# Patient Record
Sex: FEMALE | Race: ASIAN | NOT HISPANIC OR LATINO
[De-identification: names, ages, dates, MRNs, and addresses within clinical notes are randomized per-mention and may not be internally consistent; named-entity substitution may affect disease eponyms.]

---

## 2019-04-22 ENCOUNTER — TRANSCRIPTION ENCOUNTER (OUTPATIENT)
Age: 39
End: 2019-04-22

## 2020-10-21 ENCOUNTER — TRANSCRIPTION ENCOUNTER (OUTPATIENT)
Age: 40
End: 2020-10-21

## 2020-10-21 VITALS
HEIGHT: 61 IN | TEMPERATURE: 98 F | WEIGHT: 171.3 LBS | RESPIRATION RATE: 16 BRPM | DIASTOLIC BLOOD PRESSURE: 67 MMHG | SYSTOLIC BLOOD PRESSURE: 99 MMHG | OXYGEN SATURATION: 98 % | HEART RATE: 89 BPM

## 2020-10-22 ENCOUNTER — INPATIENT (INPATIENT)
Facility: HOSPITAL | Age: 40
LOS: 0 days | Discharge: HOME | DRG: 750 | End: 2020-10-23
Attending: OBSTETRICS & GYNECOLOGY | Admitting: OBSTETRICS & GYNECOLOGY
Payer: COMMERCIAL

## 2020-10-22 ENCOUNTER — RESULT REVIEW (OUTPATIENT)
Age: 40
End: 2020-10-22

## 2020-10-22 DIAGNOSIS — N80.9 ENDOMETRIOSIS, UNSPECIFIED: ICD-10-CM

## 2020-10-22 DIAGNOSIS — Z98.89 OTHER SPECIFIED POSTPROCEDURAL STATES: Chronic | ICD-10-CM

## 2020-10-22 DIAGNOSIS — Z98.890 OTHER SPECIFIED POSTPROCEDURAL STATES: ICD-10-CM

## 2020-10-22 PROCEDURE — 88342 IMHCHEM/IMCYTCHM 1ST ANTB: CPT | Mod: 26

## 2020-10-22 PROCEDURE — 88341 IMHCHEM/IMCYTCHM EA ADD ANTB: CPT | Mod: 26

## 2020-10-22 PROCEDURE — 88305 TISSUE EXAM BY PATHOLOGIST: CPT | Mod: 26

## 2020-10-22 RX ORDER — CELECOXIB 200 MG/1
400 CAPSULE ORAL ONCE
Refills: 0 | Status: COMPLETED | OUTPATIENT
Start: 2020-10-22 | End: 2020-10-22

## 2020-10-22 RX ORDER — HEPARIN SODIUM 5000 [USP'U]/ML
5000 INJECTION INTRAVENOUS; SUBCUTANEOUS ONCE
Refills: 0 | Status: COMPLETED | OUTPATIENT
Start: 2020-10-22 | End: 2020-10-22

## 2020-10-22 RX ORDER — GABAPENTIN 400 MG/1
600 CAPSULE ORAL ONCE
Refills: 0 | Status: COMPLETED | OUTPATIENT
Start: 2020-10-22 | End: 2020-10-22

## 2020-10-22 RX ORDER — SIMETHICONE 80 MG/1
80 TABLET, CHEWABLE ORAL EVERY 6 HOURS
Refills: 0 | Status: DISCONTINUED | OUTPATIENT
Start: 2020-10-22 | End: 2020-10-23

## 2020-10-22 RX ORDER — METOCLOPRAMIDE HCL 10 MG
10 TABLET ORAL EVERY 8 HOURS
Refills: 0 | Status: DISCONTINUED | OUTPATIENT
Start: 2020-10-22 | End: 2020-10-23

## 2020-10-22 RX ORDER — ALBUTEROL 90 UG/1
2 AEROSOL, METERED ORAL EVERY 4 HOURS
Refills: 0 | Status: DISCONTINUED | OUTPATIENT
Start: 2020-10-22 | End: 2020-10-23

## 2020-10-22 RX ORDER — SODIUM CHLORIDE 9 MG/ML
1000 INJECTION, SOLUTION INTRAVENOUS
Refills: 0 | Status: DISCONTINUED | OUTPATIENT
Start: 2020-10-22 | End: 2020-10-23

## 2020-10-22 RX ORDER — ONDANSETRON 8 MG/1
8 TABLET, FILM COATED ORAL EVERY 8 HOURS
Refills: 0 | Status: DISCONTINUED | OUTPATIENT
Start: 2020-10-22 | End: 2020-10-23

## 2020-10-22 RX ORDER — ACETAMINOPHEN 500 MG
1000 TABLET ORAL ONCE
Refills: 0 | Status: COMPLETED | OUTPATIENT
Start: 2020-10-22 | End: 2020-10-22

## 2020-10-22 RX ORDER — KETOROLAC TROMETHAMINE 30 MG/ML
30 SYRINGE (ML) INJECTION EVERY 6 HOURS
Refills: 0 | Status: DISCONTINUED | OUTPATIENT
Start: 2020-10-22 | End: 2020-10-23

## 2020-10-22 RX ORDER — ACETAMINOPHEN 500 MG
1000 TABLET ORAL EVERY 6 HOURS
Refills: 0 | Status: DISCONTINUED | OUTPATIENT
Start: 2020-10-22 | End: 2020-10-23

## 2020-10-22 RX ORDER — HYDROMORPHONE HYDROCHLORIDE 2 MG/ML
0.5 INJECTION INTRAMUSCULAR; INTRAVENOUS; SUBCUTANEOUS EVERY 6 HOURS
Refills: 0 | Status: DISCONTINUED | OUTPATIENT
Start: 2020-10-22 | End: 2020-10-23

## 2020-10-22 RX ORDER — HYDROMORPHONE HYDROCHLORIDE 2 MG/ML
0.5 INJECTION INTRAMUSCULAR; INTRAVENOUS; SUBCUTANEOUS
Refills: 0 | Status: DISCONTINUED | OUTPATIENT
Start: 2020-10-22 | End: 2020-10-22

## 2020-10-22 RX ADMIN — GABAPENTIN 600 MILLIGRAM(S): 400 CAPSULE ORAL at 07:43

## 2020-10-22 RX ADMIN — HYDROMORPHONE HYDROCHLORIDE 0.5 MILLIGRAM(S): 2 INJECTION INTRAMUSCULAR; INTRAVENOUS; SUBCUTANEOUS at 10:58

## 2020-10-22 RX ADMIN — Medication 30 MILLIGRAM(S): at 12:26

## 2020-10-22 RX ADMIN — CELECOXIB 400 MILLIGRAM(S): 200 CAPSULE ORAL at 07:43

## 2020-10-22 RX ADMIN — Medication 1000 MILLIGRAM(S): at 13:26

## 2020-10-22 RX ADMIN — Medication 30 MILLIGRAM(S): at 19:00

## 2020-10-22 RX ADMIN — HEPARIN SODIUM 5000 UNIT(S): 5000 INJECTION INTRAVENOUS; SUBCUTANEOUS at 07:44

## 2020-10-22 RX ADMIN — HYDROMORPHONE HYDROCHLORIDE 0.5 MILLIGRAM(S): 2 INJECTION INTRAMUSCULAR; INTRAVENOUS; SUBCUTANEOUS at 10:37

## 2020-10-22 RX ADMIN — Medication 1000 MILLIGRAM(S): at 14:12

## 2020-10-22 RX ADMIN — HYDROMORPHONE HYDROCHLORIDE 0.5 MILLIGRAM(S): 2 INJECTION INTRAMUSCULAR; INTRAVENOUS; SUBCUTANEOUS at 10:15

## 2020-10-22 RX ADMIN — Medication 1000 MILLIGRAM(S): at 07:44

## 2020-10-22 RX ADMIN — HYDROMORPHONE HYDROCHLORIDE 0.5 MILLIGRAM(S): 2 INJECTION INTRAMUSCULAR; INTRAVENOUS; SUBCUTANEOUS at 12:35

## 2020-10-22 RX ADMIN — HYDROMORPHONE HYDROCHLORIDE 0.5 MILLIGRAM(S): 2 INJECTION INTRAMUSCULAR; INTRAVENOUS; SUBCUTANEOUS at 10:30

## 2020-10-22 RX ADMIN — Medication 30 MILLIGRAM(S): at 13:00

## 2020-10-22 RX ADMIN — HYDROMORPHONE HYDROCHLORIDE 0.5 MILLIGRAM(S): 2 INJECTION INTRAMUSCULAR; INTRAVENOUS; SUBCUTANEOUS at 12:02

## 2020-10-22 RX ADMIN — Medication 1000 MILLIGRAM(S): at 19:00

## 2020-10-22 NOTE — H&P ADULT - PROBLEM SELECTOR PLAN 1
-NPO, IVF  -ERAS meds  -Anesthesia consult   -Consents signed, Dr. Alves at bedside     Daniel Figueroa PGY2

## 2020-10-22 NOTE — BRIEF OPERATIVE NOTE - NSICDXBRIEFPROCEDURE_GEN_ALL_CORE_FT
PROCEDURES:  Excision, endometriosis, laparoscopic 22-Oct-2020 09:55:58  Daniel Figueroa  Hysteroscopy, with dilation and curettage 22-Oct-2020 09:55:46  Daniel Figueroa

## 2020-10-22 NOTE — H&P ADULT - HISTORY OF PRESENT ILLNESS
CHARLIE WYNNE  40y  Female [id number]    HPI: Patient is a 40 year old G0 who presents for scheduled laparoscopic surgery for chronic pelvic pain.         Name of GYN Physician: Joselyn     POB:  G0  GynHx: chronic pelvic pain  PMHx: asthma, excema  SurgHx: diagnostic laparoscopy 2012  Meds: motrin 800mg prn, tramadol 50mg prn, medical THC, tizanidine prn  All: milk, neosporin (rash)   SocHx: social drinker, denies toabcco use, ilicit drug use                          CHARLIE WYNNE  40y  Female [id number]    HPI: Patient is a 40 year old G0, LMP q 4 months with mild spotting (Mirena IUD in place) who presents for scheduled laparoscopic surgery for chronic pelvic pain. She has had pain for 8+ years and had a diagnostic l/s in 2012 but was inconclusive. However she still struggles with b/l abdominal pain, worse the last 2 weeks of the month. She describes it as pulling, deep; associted nausea/vomitting when the pain is severe. SHe also reports ocassional loose stools but denies any pain with defication .        Name of GYN Physician: Joselyn     POB:  G0  GynHx: chronic pelvic pain  PMHx: asthma, excema  SurgHx: diagnostic laparoscopy 2012  Meds: motrin 800mg prn, tramadol 50mg prn, medical THC, tizanidine prn  All: milk, neosporin (rash)   SocHx: social drinker, denies toabcco use, ilicit drug use                          CHARLIE WYNNE  40y  Female [id number]    HPI: Patient is a 40 year old G0, LMP q 4 months with mild spotting (Mirena IUD in place) who presents for scheduled laparoscopic surgery for chronic pelvic pain. She has had pain for 8+ years and had a diagnostic l/s in 2012 but was inconclusive. However she still struggles with b/l abdominal pain, worse the last 2 weeks of the month. She describes it as pulling, deep; associted nausea/vomitting when the pain is severe. SHe also reports occasional loose stools but denies any pain with defecation denies any dyspareunia She also reports bilateral anterior leg pain when she experiences her abdominal pain at its worse.         Name of GYN Physician: Joselyn     POB:  G0  GynHx: chronic pelvic pain  PMHx: asthma, excema  SurgHx: diagnostic laparoscopy 2012  Meds: motrin 800mg prn, tramadol 50mg prn, medical THC, tizanidine prn  All: milk, neosporin (rash)   SocHx: social drinker, denies toabcco use, ilicit drug use

## 2020-10-22 NOTE — H&P ADULT - ASSESSMENT
40 year old G0 with PMH of chronic pelvic pain who presents for scheduled laparoscopic endometriosis excision.

## 2020-10-22 NOTE — PROGRESS NOTE ADULT - PROBLEM SELECTOR PLAN 1
Neuro: tylenol/toradol for pain, dilaudid PRN, zofran/reglan   CV: Hemodynamically stable  Pulm: Saturating well on room air. Encourage incentive spirometry  GI: continue regular diet   : UOP adequate, monitor overnight  Heme: SCDs, ambulation; f/u AM labs     Daniel Figueroa PGY2

## 2020-10-22 NOTE — PROGRESS NOTE ADULT - SUBJECTIVE AND OBJECTIVE BOX
Patient seen and examined at bedside. No acute complaints. Pain well controlled.  Patient tolerating clears. Has not yet passed flatus. Bacon in place. Denies CP, SOB, N/V, fevers, and chills.    Vital Signs Last 24 Hours  T(C): 36.8 (10-22-20 @ 13:14), Max: 36.8 (10-22-20 @ 13:14)  HR: 77 (10-22-20 @ 13:14) (74 - 97)  BP: 101/56 (10-22-20 @ 13:14) (82/46 - 112/63)  RR: 18 (10-22-20 @ 13:14) (10 - 18)  SpO2: 99% (10-22-20 @ 13:14) (98% - 100%)    I&O's Summary    22 Oct 2020 07:01  -  22 Oct 2020 14:49  --------------------------------------------------------  IN: 500 mL / OUT: 550 mL / NET: -50 mL        Physical Exam:  General: NAD  CV: NR, RR, S1, S2, no M/R/G  Lungs: CTA-B  Abdomen: Soft, non-distended, appropriately tender  Incision: CDI, dressings in place   Ext: No pain or swelling    Labs:      MEDICATIONS  (STANDING):  acetaminophen   Tablet .. 1000 milliGRAM(s) Oral every 6 hours  ketorolac   Injectable 30 milliGRAM(s) IV Push every 6 hours  lactated ringers. 1000 milliLiter(s) (125 mL/Hr) IV Continuous <Continuous>    MEDICATIONS  (PRN):  ALBUTerol    90 MICROgram(s) HFA Inhaler 2 Puff(s) Inhalation every 4 hours PRN Bronchospasm  HYDROmorphone  Injectable 0.5 milliGRAM(s) IV Push every 6 hours PRN Breakthrough Pain  metoclopramide Injectable 10 milliGRAM(s) IV Push every 8 hours PRN Nausea  ondansetron Injectable 8 milliGRAM(s) IV Push every 8 hours PRN Nausea and/or Vomiting  simethicone 80 milliGRAM(s) Chew every 6 hours PRN Gas

## 2020-10-22 NOTE — BRIEF OPERATIVE NOTE - OPERATION/FINDINGS
Mirena IUD removed on hysteroscopy D&C; hypervascularity seen near both ostia, benign appearing endometrium. Normal appearing uterus, tubes, and ovaries. Grossly normal abdominal anatomy. Ovarian suspensions placed. Dissection of sigmoid colon from left side wall. Bilateral uterolysis. Endometrial implants appreciated and excised from right and left pelvic side walls. Hemostasis achieved.  Appendix not visualized. Fascia closed with 0 vicryl suture. Skin closed with 4.0 monocryl. Count corrrect, patient cleaned and stable.

## 2020-10-22 NOTE — H&P ADULT - NSHPPHYSICALEXAM_GEN_ALL_CORE
VS  T(C): 36.8 (10-21-20 @ 11:46)  HR: 89 (10-21-20 @ 11:46)  BP: 99/67 (10-21-20 @ 11:46)  RR: 16 (10-21-20 @ 11:46)  SpO2: 98% (10-21-20 @ 11:46)    Gen: NAD  CV: RRR  Resp: lungs CTA  Abd: soft nontender; tender to palpation in R/L lower quadrants, no rebound, no guarding

## 2020-10-23 ENCOUNTER — TRANSCRIPTION ENCOUNTER (OUTPATIENT)
Age: 40
End: 2020-10-23

## 2020-10-23 VITALS
HEART RATE: 81 BPM | SYSTOLIC BLOOD PRESSURE: 96 MMHG | OXYGEN SATURATION: 98 % | DIASTOLIC BLOOD PRESSURE: 63 MMHG | TEMPERATURE: 98 F | RESPIRATION RATE: 16 BRPM

## 2020-10-23 LAB
ANION GAP SERPL CALC-SCNC: 8 MMOL/L — SIGNIFICANT CHANGE UP (ref 5–17)
BUN SERPL-MCNC: 11 MG/DL — SIGNIFICANT CHANGE UP (ref 7–23)
CALCIUM SERPL-MCNC: 9 MG/DL — SIGNIFICANT CHANGE UP (ref 8.4–10.5)
CHLORIDE SERPL-SCNC: 106 MMOL/L — SIGNIFICANT CHANGE UP (ref 96–108)
CO2 SERPL-SCNC: 24 MMOL/L — SIGNIFICANT CHANGE UP (ref 22–31)
CREAT SERPL-MCNC: 0.64 MG/DL — SIGNIFICANT CHANGE UP (ref 0.5–1.3)
GLUCOSE SERPL-MCNC: 135 MG/DL — HIGH (ref 70–99)
HCT VFR BLD CALC: 33.7 % — LOW (ref 34.5–45)
HGB BLD-MCNC: 10.9 G/DL — LOW (ref 11.5–15.5)
MAGNESIUM SERPL-MCNC: 1.9 MG/DL — SIGNIFICANT CHANGE UP (ref 1.6–2.6)
MCHC RBC-ENTMCNC: 28.6 PG — SIGNIFICANT CHANGE UP (ref 27–34)
MCHC RBC-ENTMCNC: 32.3 GM/DL — SIGNIFICANT CHANGE UP (ref 32–36)
MCV RBC AUTO: 88.5 FL — SIGNIFICANT CHANGE UP (ref 80–100)
NRBC # BLD: 0 /100 WBCS — SIGNIFICANT CHANGE UP (ref 0–0)
PHOSPHATE SERPL-MCNC: 3.1 MG/DL — SIGNIFICANT CHANGE UP (ref 2.5–4.5)
PLATELET # BLD AUTO: 255 K/UL — SIGNIFICANT CHANGE UP (ref 150–400)
POTASSIUM SERPL-MCNC: 4.1 MMOL/L — SIGNIFICANT CHANGE UP (ref 3.5–5.3)
POTASSIUM SERPL-SCNC: 4.1 MMOL/L — SIGNIFICANT CHANGE UP (ref 3.5–5.3)
RBC # BLD: 3.81 M/UL — SIGNIFICANT CHANGE UP (ref 3.8–5.2)
RBC # FLD: 13.5 % — SIGNIFICANT CHANGE UP (ref 10.3–14.5)
SODIUM SERPL-SCNC: 138 MMOL/L — SIGNIFICANT CHANGE UP (ref 135–145)
WBC # BLD: 12.17 K/UL — HIGH (ref 3.8–10.5)
WBC # FLD AUTO: 12.17 K/UL — HIGH (ref 3.8–10.5)

## 2020-10-23 PROCEDURE — 36415 COLL VENOUS BLD VENIPUNCTURE: CPT

## 2020-10-23 PROCEDURE — 86901 BLOOD TYPING SEROLOGIC RH(D): CPT

## 2020-10-23 PROCEDURE — 88341 IMHCHEM/IMCYTCHM EA ADD ANTB: CPT

## 2020-10-23 PROCEDURE — 85027 COMPLETE CBC AUTOMATED: CPT

## 2020-10-23 PROCEDURE — 80048 BASIC METABOLIC PNL TOTAL CA: CPT

## 2020-10-23 PROCEDURE — 88305 TISSUE EXAM BY PATHOLOGIST: CPT

## 2020-10-23 PROCEDURE — 84100 ASSAY OF PHOSPHORUS: CPT

## 2020-10-23 PROCEDURE — 86850 RBC ANTIBODY SCREEN: CPT

## 2020-10-23 PROCEDURE — 86900 BLOOD TYPING SEROLOGIC ABO: CPT

## 2020-10-23 PROCEDURE — 83735 ASSAY OF MAGNESIUM: CPT

## 2020-10-23 RX ORDER — ACETAMINOPHEN 500 MG
2 TABLET ORAL
Qty: 0 | Refills: 0 | DISCHARGE
Start: 2020-10-23

## 2020-10-23 RX ORDER — MAGNESIUM SULFATE 500 MG/ML
2 VIAL (ML) INJECTION ONCE
Refills: 0 | Status: COMPLETED | OUTPATIENT
Start: 2020-10-23 | End: 2020-10-23

## 2020-10-23 RX ORDER — ALBUTEROL 90 UG/1
2 AEROSOL, METERED ORAL
Qty: 0 | Refills: 0 | DISCHARGE
Start: 2020-10-23

## 2020-10-23 RX ADMIN — Medication 30 MILLIGRAM(S): at 01:00

## 2020-10-23 RX ADMIN — Medication 30 MILLIGRAM(S): at 00:46

## 2020-10-23 RX ADMIN — Medication 1000 MILLIGRAM(S): at 01:00

## 2020-10-23 RX ADMIN — Medication 30 MILLIGRAM(S): at 06:39

## 2020-10-23 RX ADMIN — Medication 1000 MILLIGRAM(S): at 06:39

## 2020-10-23 RX ADMIN — Medication 30 MILLIGRAM(S): at 11:48

## 2020-10-23 RX ADMIN — Medication 30 MILLIGRAM(S): at 12:17

## 2020-10-23 RX ADMIN — Medication 1000 MILLIGRAM(S): at 00:46

## 2020-10-23 RX ADMIN — Medication 50 GRAM(S): at 11:47

## 2020-10-23 RX ADMIN — Medication 1000 MILLIGRAM(S): at 12:17

## 2020-10-23 RX ADMIN — Medication 1000 MILLIGRAM(S): at 11:47

## 2020-10-23 RX ADMIN — SIMETHICONE 80 MILLIGRAM(S): 80 TABLET, CHEWABLE ORAL at 11:57

## 2020-10-23 NOTE — PROGRESS NOTE ADULT - ASSESSMENT
40y POD# 1  s/p laparoscopic endometriosis excision, bilateral uterolysis, hysteroscopy D&C,  removal if IUD without complication.  Patient is stable and doing well.  Patient is meeting all post op milestones.

## 2020-10-23 NOTE — PROGRESS NOTE ADULT - PROBLEM SELECTOR PLAN 1
Neuro: pain is well controlled; continue tylenol/toradol, transition to motrin   CV: hemodynamically stable, monitor vitals  Pulm: saturating well on room air, encourage oob/amb  GI: tolerating regular diet, hep lock IV   : remove cheng; patient due to void   Heme: SCDs for DVT ppx, AM H/H within expected limits   ID: afebrile  Dispo: continue routine post-op care    Daniel Figueroa PGY2

## 2020-10-23 NOTE — DISCHARGE NOTE NURSING/CASE MANAGEMENT/SOCIAL WORK - PATIENT PORTAL LINK FT
You can access the FollowMyHealth Patient Portal offered by [facility] by registering at the following website: [email or web address]. By joining [employer]’s FollowMyHealth portal, you will also be able to view your health information using other applications (apps) compatible with our system.

## 2020-10-23 NOTE — DISCHARGE NOTE PROVIDER - CARE PROVIDER_API CALL
Hemalatha Alves  OBSTETRICS AND GYNECOLOGY  [address] [place] [address]  Phone: [phone]  Fax: [phone]  Established Patient  Follow Up Time:

## 2020-10-23 NOTE — DISCHARGE NOTE PROVIDER - NSDCMRMEDTOKEN_GEN_ALL_CORE_FT
acetaminophen 500 mg oral tablet: 2 tab(s) orally every 6 hours  albuterol 90 mcg/inh inhalation aerosol: 2 puff(s) inhaled every 4 hours, As needed, Bronchospasm

## 2020-10-23 NOTE — DISCHARGE NOTE PROVIDER - INSTRUCTIONS
Regular diet as tolerated: avoid too much fiber or fat in your diet if you feel bloated. Avoid drinking alcohol when taking narcotic pain medication.

## 2020-10-23 NOTE — DISCHARGE NOTE PROVIDER - HOSPITAL COURSE
40y POD# 1  s/p laparoscopic endometriosis excision, bilateral uterolysis, hysteroscopy D&C,  removal if IUD without complication.  Patient is stable and doing well.  Patient is meeting all post op milestones. Vitals are stable for discharge; patient will have close follow up as an outpatient.

## 2020-10-23 NOTE — PROGRESS NOTE ADULT - SUBJECTIVE AND OBJECTIVE BOX
R2 GYN Progress Note  POD#1   HD#2    Patient seen and examined at bedside.  No acute events overnight. No acute complaints.  Pain well controlled.  Patient has not ambulated; tolerated a regular diet.   Has not yet passed flatus yet.   Bacon is still in place.   Denies CP, SOB, N/V, fevers, and chills.    Vital Signs Last 24 Hours  T(C): 36.8 (10-23-20 @ 05:53), Max: 37.3 (10-22-20 @ 17:51)  HR: 89 (10-23-20 @ 05:53) (74 - 97)  BP: 99/63 (10-23-20 @ 05:53) (82/46 - 112/63)  RR: 17 (10-23-20 @ 05:53) (10 - 18)  SpO2: 99% (10-23-20 @ 05:53) (98% - 100%)    I&O's Summary    22 Oct 2020 07:01  -  23 Oct 2020 06:46  --------------------------------------------------------  IN: 2110 mL / OUT: 1200 mL / NET: 910 mL        Physical Exam:  General: NAD  CV: RR, S1, S2  Lungs: CTA b/l, good air flow b/l   Abdomen: Soft, non distended, appropriately tender to palpation; bowel sounds present in all quadrants   Incision: port site incisions c/d/i, bilateral ovarian suspensions removed   Ext: warm and well perfused    Labs:                        10.9   12.17 )-----------( 255      ( 23 Oct 2020 06:21 )             33.7         MEDICATIONS  (STANDING):  acetaminophen   Tablet .. 1000 milliGRAM(s) Oral every 6 hours  ketorolac   Injectable 30 milliGRAM(s) IV Push every 6 hours  lactated ringers. 1000 milliLiter(s) (125 mL/Hr) IV Continuous <Continuous>    MEDICATIONS  (PRN):  ALBUTerol    90 MICROgram(s) HFA Inhaler 2 Puff(s) Inhalation every 4 hours PRN Bronchospasm  HYDROmorphone  Injectable 0.5 milliGRAM(s) IV Push every 6 hours PRN Breakthrough Pain  metoclopramide Injectable 10 milliGRAM(s) IV Push every 8 hours PRN Nausea  ondansetron Injectable 8 milliGRAM(s) IV Push every 8 hours PRN Nausea and/or Vomiting  simethicone 80 milliGRAM(s) Chew every 6 hours PRN Gas

## 2020-10-28 LAB — SURGICAL PATHOLOGY STUDY: SIGNIFICANT CHANGE UP

## 2020-10-29 DIAGNOSIS — N80.8 OTHER ENDOMETRIOSIS: ICD-10-CM

## 2020-10-29 DIAGNOSIS — N84.0 POLYP OF CORPUS UTERI: ICD-10-CM
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
## 2020-10-29 DIAGNOSIS — T83.39XA OTHER MECHANICAL COMPLICATION OF INTRAUTERINE CONTRACEPTIVE DEVICE, INITIAL ENCOUNTER: ICD-10-CM

## 2020-10-29 DIAGNOSIS — K66.8 OTHER SPECIFIED DISORDERS OF PERITONEUM: ICD-10-CM

## 2020-10-29 DIAGNOSIS — Z82.49 FAMILY HISTORY OF ISCHEMIC HEART DISEASE AND OTHER DISEASES OF THE CIRCULATORY SYSTEM: ICD-10-CM

## 2020-10-29 DIAGNOSIS — Z91.011 ALLERGY TO MILK PRODUCTS: ICD-10-CM

## 2020-10-29 DIAGNOSIS — K63.89 OTHER SPECIFIED DISEASES OF INTESTINE: ICD-10-CM

## 2020-10-29 DIAGNOSIS — R19.8 OTHER SPECIFIED SYMPTOMS AND SIGNS INVOLVING THE DIGESTIVE SYSTEM AND ABDOMEN: ICD-10-CM
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
## 2020-10-29 DIAGNOSIS — J45.909 UNSPECIFIED ASTHMA, UNCOMPLICATED: ICD-10-CM

## 2022-05-15 ENCOUNTER — TRANSCRIPTION ENCOUNTER (OUTPATIENT)
Age: 42
End: 2022-05-15

## 2022-05-16 ENCOUNTER — INPATIENT (INPATIENT)
Facility: HOSPITAL | Age: 42
LOS: 3 days | Discharge: HOME | End: 2022-05-20
Attending: UROLOGY | Admitting: UROLOGY
Payer: COMMERCIAL

## 2022-05-16 VITALS
DIASTOLIC BLOOD PRESSURE: 61 MMHG | HEIGHT: 61 IN | TEMPERATURE: 98 F | OXYGEN SATURATION: 98 % | RESPIRATION RATE: 16 BRPM | SYSTOLIC BLOOD PRESSURE: 111 MMHG | HEART RATE: 105 BPM

## 2022-05-16 DIAGNOSIS — Z98.89 OTHER SPECIFIED POSTPROCEDURAL STATES: Chronic | ICD-10-CM

## 2022-05-16 DIAGNOSIS — N20.0 CALCULUS OF KIDNEY: ICD-10-CM

## 2022-05-16 PROBLEM — J45.909 UNSPECIFIED ASTHMA, UNCOMPLICATED: Chronic | Status: ACTIVE | Noted: 2020-10-21

## 2022-05-16 PROBLEM — N80.9 ENDOMETRIOSIS, UNSPECIFIED: Chronic | Status: ACTIVE | Noted: 2020-10-21

## 2022-05-16 PROBLEM — N83.209 UNSPECIFIED OVARIAN CYST, UNSPECIFIED SIDE: Chronic | Status: ACTIVE | Noted: 2020-10-21

## 2022-05-16 LAB
ALBUMIN SERPL ELPH-MCNC: 4.1 G/DL — SIGNIFICANT CHANGE UP (ref 3.3–5)
ALP SERPL-CCNC: 40 U/L — SIGNIFICANT CHANGE UP (ref 40–120)
ALT FLD-CCNC: 11 U/L — SIGNIFICANT CHANGE UP (ref 4–33)
ANION GAP SERPL CALC-SCNC: 11 MMOL/L — SIGNIFICANT CHANGE UP (ref 7–14)
ANION GAP SERPL CALC-SCNC: 12 MMOL/L — SIGNIFICANT CHANGE UP (ref 7–14)
APPEARANCE UR: CLEAR — SIGNIFICANT CHANGE UP
AST SERPL-CCNC: 15 U/L — SIGNIFICANT CHANGE UP (ref 4–32)
BASOPHILS # BLD AUTO: 0.03 K/UL — SIGNIFICANT CHANGE UP (ref 0–0.2)
BASOPHILS NFR BLD AUTO: 0.2 % — SIGNIFICANT CHANGE UP (ref 0–2)
BILIRUB SERPL-MCNC: 0.5 MG/DL — SIGNIFICANT CHANGE UP (ref 0.2–1.2)
BILIRUB UR-MCNC: NEGATIVE — SIGNIFICANT CHANGE UP
BLD GP AB SCN SERPL QL: NEGATIVE — SIGNIFICANT CHANGE UP
BLOOD GAS ARTERIAL - LYTES,HGB,ICA,LACT RESULT: SIGNIFICANT CHANGE UP
BUN SERPL-MCNC: 14 MG/DL — SIGNIFICANT CHANGE UP (ref 7–23)
BUN SERPL-MCNC: 15 MG/DL — SIGNIFICANT CHANGE UP (ref 7–23)
CALCIUM SERPL-MCNC: 7.8 MG/DL — LOW (ref 8.4–10.5)
CALCIUM SERPL-MCNC: 9.2 MG/DL — SIGNIFICANT CHANGE UP (ref 8.4–10.5)
CHLORIDE SERPL-SCNC: 106 MMOL/L — SIGNIFICANT CHANGE UP (ref 98–107)
CHLORIDE SERPL-SCNC: 114 MMOL/L — HIGH (ref 98–107)
CO2 SERPL-SCNC: 16 MMOL/L — LOW (ref 22–31)
CO2 SERPL-SCNC: 22 MMOL/L — SIGNIFICANT CHANGE UP (ref 22–31)
COLOR SPEC: YELLOW — SIGNIFICANT CHANGE UP
CREAT SERPL-MCNC: 0.83 MG/DL — SIGNIFICANT CHANGE UP (ref 0.5–1.3)
CREAT SERPL-MCNC: 0.96 MG/DL — SIGNIFICANT CHANGE UP (ref 0.5–1.3)
DIFF PNL FLD: ABNORMAL
EGFR: 77 ML/MIN/1.73M2 — SIGNIFICANT CHANGE UP
EGFR: 91 ML/MIN/1.73M2 — SIGNIFICANT CHANGE UP
EOSINOPHIL # BLD AUTO: 0 K/UL — SIGNIFICANT CHANGE UP (ref 0–0.5)
EOSINOPHIL NFR BLD AUTO: 0 % — SIGNIFICANT CHANGE UP (ref 0–6)
FLUAV AG NPH QL: SIGNIFICANT CHANGE UP
FLUBV AG NPH QL: SIGNIFICANT CHANGE UP
GAS PNL BLDA: SIGNIFICANT CHANGE UP
GLUCOSE SERPL-MCNC: 102 MG/DL — HIGH (ref 70–99)
GLUCOSE SERPL-MCNC: 124 MG/DL — HIGH (ref 70–99)
GLUCOSE UR QL: NEGATIVE — SIGNIFICANT CHANGE UP
HCT VFR BLD CALC: 31.3 % — LOW (ref 34.5–45)
HCT VFR BLD CALC: 37.6 % — SIGNIFICANT CHANGE UP (ref 34.5–45)
HGB BLD-MCNC: 10.7 G/DL — LOW (ref 11.5–15.5)
HGB BLD-MCNC: 12.6 G/DL — SIGNIFICANT CHANGE UP (ref 11.5–15.5)
IANC: 17.4 K/UL — HIGH (ref 1.8–7.4)
IMM GRANULOCYTES NFR BLD AUTO: 1.1 % — SIGNIFICANT CHANGE UP (ref 0–1.5)
KETONES UR-MCNC: NEGATIVE — SIGNIFICANT CHANGE UP
LEUKOCYTE ESTERASE UR-ACNC: ABNORMAL
LIDOCAIN IGE QN: 15 U/L — SIGNIFICANT CHANGE UP (ref 7–60)
LYMPHOCYTES # BLD AUTO: 0.88 K/UL — LOW (ref 1–3.3)
LYMPHOCYTES # BLD AUTO: 4.5 % — LOW (ref 13–44)
MAGNESIUM SERPL-MCNC: 1 MG/DL — CRITICAL LOW (ref 1.6–2.6)
MCHC RBC-ENTMCNC: 30.1 PG — SIGNIFICANT CHANGE UP (ref 27–34)
MCHC RBC-ENTMCNC: 31 PG — SIGNIFICANT CHANGE UP (ref 27–34)
MCHC RBC-ENTMCNC: 33.5 GM/DL — SIGNIFICANT CHANGE UP (ref 32–36)
MCHC RBC-ENTMCNC: 34.2 GM/DL — SIGNIFICANT CHANGE UP (ref 32–36)
MCV RBC AUTO: 89.7 FL — SIGNIFICANT CHANGE UP (ref 80–100)
MCV RBC AUTO: 90.7 FL — SIGNIFICANT CHANGE UP (ref 80–100)
MONOCYTES # BLD AUTO: 1.14 K/UL — HIGH (ref 0–0.9)
MONOCYTES NFR BLD AUTO: 5.8 % — SIGNIFICANT CHANGE UP (ref 2–14)
NEUTROPHILS # BLD AUTO: 17.4 K/UL — HIGH (ref 1.8–7.4)
NEUTROPHILS NFR BLD AUTO: 88.4 % — HIGH (ref 43–77)
NITRITE UR-MCNC: NEGATIVE — SIGNIFICANT CHANGE UP
NRBC # BLD: 0 /100 WBCS — SIGNIFICANT CHANGE UP
NRBC # BLD: 0 /100 WBCS — SIGNIFICANT CHANGE UP
NRBC # FLD: 0 K/UL — SIGNIFICANT CHANGE UP
NRBC # FLD: 0 K/UL — SIGNIFICANT CHANGE UP
PH UR: 6 — SIGNIFICANT CHANGE UP (ref 5–8)
PHOSPHATE SERPL-MCNC: 1.5 MG/DL — LOW (ref 2.5–4.5)
PLATELET # BLD AUTO: 140 K/UL — LOW (ref 150–400)
PLATELET # BLD AUTO: 200 K/UL — SIGNIFICANT CHANGE UP (ref 150–400)
POTASSIUM SERPL-MCNC: 3.3 MMOL/L — LOW (ref 3.5–5.3)
POTASSIUM SERPL-MCNC: 3.8 MMOL/L — SIGNIFICANT CHANGE UP (ref 3.5–5.3)
POTASSIUM SERPL-SCNC: 3.3 MMOL/L — LOW (ref 3.5–5.3)
POTASSIUM SERPL-SCNC: 3.8 MMOL/L — SIGNIFICANT CHANGE UP (ref 3.5–5.3)
PROT SERPL-MCNC: 7.2 G/DL — SIGNIFICANT CHANGE UP (ref 6–8.3)
PROT UR-MCNC: ABNORMAL
RBC # BLD: 3.45 M/UL — LOW (ref 3.8–5.2)
RBC # BLD: 4.19 M/UL — SIGNIFICANT CHANGE UP (ref 3.8–5.2)
RBC # FLD: 13.5 % — SIGNIFICANT CHANGE UP (ref 10.3–14.5)
RBC # FLD: 13.7 % — SIGNIFICANT CHANGE UP (ref 10.3–14.5)
RBC CASTS # UR COMP ASSIST: SIGNIFICANT CHANGE UP /HPF (ref 0–4)
RH IG SCN BLD-IMP: POSITIVE — SIGNIFICANT CHANGE UP
RSV RNA NPH QL NAA+NON-PROBE: SIGNIFICANT CHANGE UP
SARS-COV-2 RNA SPEC QL NAA+PROBE: SIGNIFICANT CHANGE UP
SODIUM SERPL-SCNC: 139 MMOL/L — SIGNIFICANT CHANGE UP (ref 135–145)
SODIUM SERPL-SCNC: 142 MMOL/L — SIGNIFICANT CHANGE UP (ref 135–145)
SP GR SPEC: 1.03 — SIGNIFICANT CHANGE UP (ref 1–1.05)
UROBILINOGEN FLD QL: SIGNIFICANT CHANGE UP
WBC # BLD: 15.91 K/UL — HIGH (ref 3.8–10.5)
WBC # BLD: 19.67 K/UL — HIGH (ref 3.8–10.5)
WBC # FLD AUTO: 15.91 K/UL — HIGH (ref 3.8–10.5)
WBC # FLD AUTO: 19.67 K/UL — HIGH (ref 3.8–10.5)
WBC UR QL: SIGNIFICANT CHANGE UP /HPF (ref 0–5)

## 2022-05-16 PROCEDURE — 99222 1ST HOSP IP/OBS MODERATE 55: CPT | Mod: 57,25

## 2022-05-16 PROCEDURE — 99222 1ST HOSP IP/OBS MODERATE 55: CPT

## 2022-05-16 PROCEDURE — 52332 CYSTOSCOPY AND TREATMENT: CPT | Mod: LT

## 2022-05-16 PROCEDURE — 74420 UROGRAPHY RTRGR +-KUB: CPT | Mod: 26,LT

## 2022-05-16 PROCEDURE — 74176 CT ABD & PELVIS W/O CONTRAST: CPT | Mod: 26,MA

## 2022-05-16 PROCEDURE — 99285 EMERGENCY DEPT VISIT HI MDM: CPT

## 2022-05-16 DEVICE — GUIDEWIRE SENSOR DUAL-FLEX NITINOL STRAIGHT .038" X 150CM: Type: IMPLANTABLE DEVICE | Site: LEFT | Status: FUNCTIONAL

## 2022-05-16 DEVICE — URETERAL CATH OPEN END 5FR 70CM: Type: IMPLANTABLE DEVICE | Site: LEFT | Status: FUNCTIONAL

## 2022-05-16 DEVICE — KIT URET PERFLX PLUS 6FRX26CM: Type: IMPLANTABLE DEVICE | Site: LEFT | Status: FUNCTIONAL

## 2022-05-16 RX ORDER — SODIUM CHLORIDE 9 MG/ML
1000 INJECTION INTRAMUSCULAR; INTRAVENOUS; SUBCUTANEOUS ONCE
Refills: 0 | Status: COMPLETED | OUTPATIENT
Start: 2022-05-16 | End: 2022-05-16

## 2022-05-16 RX ORDER — FENTANYL CITRATE 50 UG/ML
25 INJECTION INTRAVENOUS
Refills: 0 | Status: DISCONTINUED | OUTPATIENT
Start: 2022-05-16 | End: 2022-05-16

## 2022-05-16 RX ORDER — POTASSIUM CHLORIDE 20 MEQ
40 PACKET (EA) ORAL ONCE
Refills: 0 | Status: COMPLETED | OUTPATIENT
Start: 2022-05-16 | End: 2022-05-16

## 2022-05-16 RX ORDER — CEFTRIAXONE 500 MG/1
1000 INJECTION, POWDER, FOR SOLUTION INTRAMUSCULAR; INTRAVENOUS ONCE
Refills: 0 | Status: COMPLETED | OUTPATIENT
Start: 2022-05-16 | End: 2022-05-16

## 2022-05-16 RX ORDER — SODIUM CHLORIDE 9 MG/ML
1000 INJECTION, SOLUTION INTRAVENOUS ONCE
Refills: 0 | Status: COMPLETED | OUTPATIENT
Start: 2022-05-16 | End: 2022-05-16

## 2022-05-16 RX ORDER — ALBUTEROL 90 UG/1
2 AEROSOL, METERED ORAL EVERY 6 HOURS
Refills: 0 | Status: DISCONTINUED | OUTPATIENT
Start: 2022-05-16 | End: 2022-05-20

## 2022-05-16 RX ORDER — PIPERACILLIN AND TAZOBACTAM 4; .5 G/20ML; G/20ML
3.38 INJECTION, POWDER, LYOPHILIZED, FOR SOLUTION INTRAVENOUS ONCE
Refills: 0 | Status: COMPLETED | OUTPATIENT
Start: 2022-05-16 | End: 2022-05-16

## 2022-05-16 RX ORDER — MORPHINE SULFATE 50 MG/1
2 CAPSULE, EXTENDED RELEASE ORAL ONCE
Refills: 0 | Status: DISCONTINUED | OUTPATIENT
Start: 2022-05-16 | End: 2022-05-16

## 2022-05-16 RX ORDER — ONDANSETRON 8 MG/1
4 TABLET, FILM COATED ORAL ONCE
Refills: 0 | Status: COMPLETED | OUTPATIENT
Start: 2022-05-16 | End: 2022-05-16

## 2022-05-16 RX ORDER — ACETAMINOPHEN 500 MG
1000 TABLET ORAL ONCE
Refills: 0 | Status: COMPLETED | OUTPATIENT
Start: 2022-05-16 | End: 2022-05-16

## 2022-05-16 RX ORDER — ENOXAPARIN SODIUM 100 MG/ML
40 INJECTION SUBCUTANEOUS EVERY 24 HOURS
Refills: 0 | Status: DISCONTINUED | OUTPATIENT
Start: 2022-05-16 | End: 2022-05-20

## 2022-05-16 RX ORDER — ACETAMINOPHEN 500 MG
975 TABLET ORAL EVERY 6 HOURS
Refills: 0 | Status: DISCONTINUED | OUTPATIENT
Start: 2022-05-16 | End: 2022-05-18

## 2022-05-16 RX ORDER — PHENYLEPHRINE HYDROCHLORIDE 10 MG/ML
0.5 INJECTION INTRAVENOUS
Qty: 40 | Refills: 0 | Status: DISCONTINUED | OUTPATIENT
Start: 2022-05-16 | End: 2022-05-16

## 2022-05-16 RX ORDER — KETOROLAC TROMETHAMINE 30 MG/ML
30 SYRINGE (ML) INJECTION ONCE
Refills: 0 | Status: DISCONTINUED | OUTPATIENT
Start: 2022-05-16 | End: 2022-05-16

## 2022-05-16 RX ORDER — MAGNESIUM SULFATE 500 MG/ML
2 VIAL (ML) INJECTION ONCE
Refills: 0 | Status: COMPLETED | OUTPATIENT
Start: 2022-05-16 | End: 2022-05-16

## 2022-05-16 RX ORDER — SODIUM CHLORIDE 9 MG/ML
250 INJECTION, SOLUTION INTRAVENOUS ONCE
Refills: 0 | Status: COMPLETED | OUTPATIENT
Start: 2022-05-16 | End: 2022-05-16

## 2022-05-16 RX ORDER — PIPERACILLIN AND TAZOBACTAM 4; .5 G/20ML; G/20ML
3.38 INJECTION, POWDER, LYOPHILIZED, FOR SOLUTION INTRAVENOUS EVERY 8 HOURS
Refills: 0 | Status: DISCONTINUED | OUTPATIENT
Start: 2022-05-16 | End: 2022-05-19

## 2022-05-16 RX ORDER — ONDANSETRON 8 MG/1
4 TABLET, FILM COATED ORAL ONCE
Refills: 0 | Status: DISCONTINUED | OUTPATIENT
Start: 2022-05-16 | End: 2022-05-16

## 2022-05-16 RX ORDER — SODIUM CHLORIDE 9 MG/ML
1000 INJECTION, SOLUTION INTRAVENOUS
Refills: 0 | Status: DISCONTINUED | OUTPATIENT
Start: 2022-05-16 | End: 2022-05-17

## 2022-05-16 RX ADMIN — Medication 30 MILLIGRAM(S): at 08:48

## 2022-05-16 RX ADMIN — SODIUM CHLORIDE 1000 MILLILITER(S): 9 INJECTION INTRAMUSCULAR; INTRAVENOUS; SUBCUTANEOUS at 13:01

## 2022-05-16 RX ADMIN — SODIUM CHLORIDE 100 MILLILITER(S): 9 INJECTION, SOLUTION INTRAVENOUS at 16:50

## 2022-05-16 RX ADMIN — Medication 85 MILLIMOLE(S): at 18:04

## 2022-05-16 RX ADMIN — Medication 25 GRAM(S): at 17:44

## 2022-05-16 RX ADMIN — CEFTRIAXONE 100 MILLIGRAM(S): 500 INJECTION, POWDER, FOR SOLUTION INTRAMUSCULAR; INTRAVENOUS at 10:36

## 2022-05-16 RX ADMIN — SODIUM CHLORIDE 1000 MILLILITER(S): 9 INJECTION, SOLUTION INTRAVENOUS at 22:11

## 2022-05-16 RX ADMIN — SODIUM CHLORIDE 1000 MILLILITER(S): 9 INJECTION, SOLUTION INTRAVENOUS at 18:41

## 2022-05-16 RX ADMIN — SODIUM CHLORIDE 1000 MILLILITER(S): 9 INJECTION INTRAMUSCULAR; INTRAVENOUS; SUBCUTANEOUS at 07:50

## 2022-05-16 RX ADMIN — PIPERACILLIN AND TAZOBACTAM 25 GRAM(S): 4; .5 INJECTION, POWDER, LYOPHILIZED, FOR SOLUTION INTRAVENOUS at 21:57

## 2022-05-16 RX ADMIN — MORPHINE SULFATE 2 MILLIGRAM(S): 50 CAPSULE, EXTENDED RELEASE ORAL at 10:52

## 2022-05-16 RX ADMIN — Medication 400 MILLIGRAM(S): at 12:10

## 2022-05-16 RX ADMIN — Medication 40 MILLIEQUIVALENT(S): at 17:43

## 2022-05-16 RX ADMIN — SODIUM CHLORIDE 100 MILLILITER(S): 9 INJECTION, SOLUTION INTRAVENOUS at 18:30

## 2022-05-16 RX ADMIN — Medication 975 MILLIGRAM(S): at 17:00

## 2022-05-16 RX ADMIN — ENOXAPARIN SODIUM 40 MILLIGRAM(S): 100 INJECTION SUBCUTANEOUS at 16:50

## 2022-05-16 RX ADMIN — Medication 975 MILLIGRAM(S): at 18:00

## 2022-05-16 RX ADMIN — PIPERACILLIN AND TAZOBACTAM 200 GRAM(S): 4; .5 INJECTION, POWDER, LYOPHILIZED, FOR SOLUTION INTRAVENOUS at 16:45

## 2022-05-16 RX ADMIN — ONDANSETRON 4 MILLIGRAM(S): 8 TABLET, FILM COATED ORAL at 07:50

## 2022-05-16 NOTE — H&P ADULT - HISTORY OF PRESENT ILLNESS
HPI  41F with PMH of asthma, endometriosis, and an infected kidney stone presents c/o lower back pain x 2 days. Reports gradual onset of lower back pain L>R since yesterday morning with radiation to the lower abdomen bilaterally. Pain became more constant and severe this AM. Has tried motrin and THC with little relief. States pain feels similar to her previous kidney stones, last saw Dr. Dorman in 2016 . pt had  nausea and 3-4 episodes of NB vomiting today. reports subjective fevers at home, CT shows 7x5mm proximal left ureteral stone, has leukocytosis to 20 and fever of 104.  pt last ate yesterday 12pm    PAST MEDICAL & SURGICAL HISTORY:  Dysmenorrhea      Endometriosis      Asthma      Kidney stones      Ovarian cyst      H/O exploratory laparotomy  2012          MEDICATIONS  (STANDING):  acetaminophen   IVPB .. 1000 milliGRAM(s) IV Intermittent once  sodium chloride 0.9% Bolus 1000 milliLiter(s) IV Bolus once    MEDICATIONS  (PRN):      FAMILY HISTORY:  Family history of hypothyroidism  father    Family history of sleep apnea  father    Family history of hyperlipidemia  mother    Family history of coronary artery disease (Grandparent)  maternal grandfather    Family history of diabetes mellitus (Grandparent)  maternal grandfather        Allergies    milk (Rash)  Neosporin (Unknown)    Intolerances        SOCIAL HISTORY:    REVIEW OF SYSTEMS: Otherwise negative as stated in HPI    Physical Exam  Vital signs  T(C): 40.4 (22 @ 11:50), Max: 40.4 (22 @ 11:50)  HR: 65 (22 @ 11:50)  BP: 109/65 (22 @ 11:50)  SpO2: 100% (22 @ 10:45)  Wt(kg): --    Output      Gen:  NAD    Pulm:  No respiratory distress  	  CV:  RRR    GI:  S/ND/NT, NO CVAT    :      MSK:      LABS:       @ 07:50    WBC 19.67 / Hct 37.6  / SCr 0.96         139  |  106  |  15  ----------------------------<  124<H>  3.8   |  22  |  0.96    Ca    9.2      16 May 2022 07:50    TPro  7.2  /  Alb  4.1  /  TBili  0.5  /  DBili  x   /  AST  15  /  ALT  11  /  AlkPhos  40        Urinalysis Basic - ( 16 May 2022 07:50 )    Color: Yellow / Appearance: Clear / S.031 / pH: x  Gluc: x / Ketone: Negative  / Bili: Negative / Urobili: <2 mg/dL   Blood: x / Protein: Trace / Nitrite: Negative   Leuk Esterase: Large / RBC: 2-5 /HPF / WBC 30-50 /HPF   Sq Epi: x / Non Sq Epi: x / Bacteria: x        Urine Cx:  Blood Cx:    RADIOLOGY:  < from: CT Abdomen and Pelvis No Cont (22 @ 09:57) >  ACC: [id number] EXAM:  CT ABDOMEN AND PELVIS                          PROCEDURE DATE:  2022          INTERPRETATION:  CLINICAL INFORMATION: Flank pain. Evaluate for left   ureteral stone.    COMPARISON: None.    CONTRAST/COMPLICATIONS:  IV Contrast: None  Oral Contrast: None  Complications: None reported at the time of exam    PROCEDURE:  CT of the Abdomen and Pelvis was performed in the prone position.  Sagittal and coronal reformats were performed.    FINDINGS:  LOWER CHEST: Right middle lobe pneumatocele.    LIVER: Within normal limits.  BILE DUCTS: Normal caliber.  GALLBLADDER: Within normal limits.  SPLEEN: Within normal limits.  PANCREAS: Within normal limits.  ADRENALS: Within normal limits.  KIDNEYS/URETERS: Mild left hydronephrosis to the level of a 5 mm stone at   the UPJ.    BLADDER: Decompressed at the time of exam  REPRODUCTIVE ORGANS: Uterus and bilateral adnexa within normal limits.    BOWEL: No bowel obstruction. Appendix is normal.  PERITONEUM: No ascites.  VESSELS:Within normal limits.  RETROPERITONEUM/LYMPH NODES: No lymphadenopathy.  ABDOMINAL WALL: Within normal limits.  BONES: Within normal limits.    IMPRESSION:  Left-sided obstructive uropathy with mild left hydronephrosis to the   level of a 5 mm stone at the UPJ.        --- End of Report ---            AIXA DAWN MD; Attending Radiologist  This document has been electronically signed. May 16 2022 10:08AM    < end of copied text >

## 2022-05-16 NOTE — ED PROVIDER NOTE - OBJECTIVE STATEMENT
41F with PMH of asthma, endometriosis, infected kidney stone presenting with lower back pain x 2 days. Reports gradual onset of lower back pain L>R since yesterday morning with radiation to the lower abdomen bilaterally. Pain became more constant and severe this AM. Has tried motrin and THC with little relief. States pain feels similar to her previous kidney stones. Endorses nausea and 3-4 episodes of NB vomiting today. Notes decreased appetite but able to tolerate fluids. LMP this week. +Subjective fevers. Denies chills, CP, SOB, diarrhea/constipation, dysuria, increased frequency, and hematuria. 41F with PMH of asthma, endometriosis, and an infected kidney stone presents c/o lower back pain x 2 days. Reports gradual onset of lower back pain L>R since yesterday morning with radiation to the lower abdomen bilaterally. Pain became more constant and severe this AM. Has tried motrin and THC with little relief. States pain feels similar to her previous kidney stones. Endorses nausea and 3-4 episodes of NB vomiting today. Notes decreased appetite but able to tolerate fluids. LMP this week. +Subjective fevers. Denies chills, CP, SOB, diarrhea/constipation, dysuria, increased frequency, and hematuria.    AILEEN Polk- agree with above.

## 2022-05-16 NOTE — PATIENT PROFILE ADULT - FALL HARM RISK - HARM RISK INTERVENTIONS

## 2022-05-16 NOTE — H&P ADULT - ASSESSMENT
41F with PMH of asthma, endometriosis, nephrolithaisis here with 7x5mm left proximal stone with fevers    Plan  -admit to urology  -NPO  -cultures  -CTX  -emergent OR for stent

## 2022-05-16 NOTE — ED PROVIDER NOTE - PHYSICAL EXAMINATION
AILEEN Polk  CONSTITUTIONAL: Well-appearing; well-nourished; in no apparent distress. Non-toxic appearing.   NEURO: Alert & oriented. Gait steady without assistance. Sensory and motor functions are grossly intact.  PSYCH: Mood appropriate. Thought processes intact.   NECK: Supple  CARD: Regular rate and rhythm, no murmurs  RESP: No accessory muscle use; breath sounds clear and equal bilaterally; no wheezes, rhonchi, or rales     ABD: Soft; non-distended. Bilateral lower abdominal tenderness L>R. No guarding or rebound.  : (+) L sided CVA tenderness.   MUSCULOSKELETAL/EXTREMITIES: FROM in all four extremities; no extremity edema.  SKIN: Warm; dry; no apparent lesions or exudate

## 2022-05-16 NOTE — ED PROVIDER NOTE - PROGRESS NOTE DETAILS
AILEEN Polk: Pt does not want to wait for pregnancy tests results - requesting pain meds to be given now. AILEEN Polk: Pt shaking in chills, rectal temp 104.8 additional fluids, IV tylenol, and COVID swab ordered. AILEEN Polk: Pt shaking in chills, rectal temp 104.8 additional fluids, IV tylenol, and COVID swab ordered. Uro at bedside, coordinating emergent OR now

## 2022-05-16 NOTE — CONSULT NOTE ADULT - ASSESSMENT
ASSESSMENT:   41F with PMH of asthma, endometriosis, and an infected kidney stone presented to ED c/o lower back pain x 2 days . States pain feels similar to her previous kidney stones, last saw Dr. Dorman in 2016 . Patient reported nausea and 3-4 episodes of NB vomiting and subjective fevers at home. CT showed 7x5mm proximal left ureteral stone; was found to have leukocytosis and was febrile to 104. Patient brought to OR for stent placement with urology. Patient became hypotensive in OR requiring 2L bolus and phenylephrine. SICU consulted for hemodynamic monitoring.     PLAN:     NEUROLOGY:  - A&Ox4.   - Pain control with tylenol prn    RESPIRATORY:  - on 3L NC  - Maintain O2 saturation >92%    CARDIOVASCULAR:  - wean off phenylephrine as tolerated  - maintain MAP >65    GI / NUTRITION:  - NPO  - IVF     RENAL / GENITOURINARY:  - Indwelling cheng catheter  - Monitor electrolytes and replete PRN  - Continue to monitor strict ins and outs q1 hour    HEMATOLOGIC:  - DVT ppx: Lovenox     INFECTIOUS DISEASE:  - s/p ceftriaxone in ED on presentation  - monitor WBC  - f/u blood cx taken in ED  - started on zosyn 05/16    ENDOCRINOLOGY:  - No active issues  - Continue to monitor glucose on BMP (goal 140-180)    Disposition: SICU           ASSESSMENT:   41F with PMH of asthma, endometriosis, and an infected kidney stone presented to ED c/o lower back pain x 2 days . States pain feels similar to her previous kidney stones, last saw Dr. Dorman in 2016 . Patient reported nausea and 3-4 episodes of NB vomiting and subjective fevers at home. CT showed 7x5mm proximal left ureteral stone; was found to have leukocytosis and was febrile to 104. Patient brought to OR for stent placement with urology. Patient became hypotensive in OR requiring 2L bolus and phenylephrine. SICU consulted for hemodynamic monitoring.     PLAN:     NEUROLOGY:  - A&Ox4.   - Pain control with tylenol prn    RESPIRATORY:  - on 3L NC  - Maintain O2 saturation >92%  - hx of asthma, continue home albuterol    CARDIOVASCULAR:  - wean off phenylephrine as tolerated  - maintain MAP >65    GI / NUTRITION:  - NPO  - IVF     RENAL / GENITOURINARY:  - Indwelling cheng catheter  - Monitor electrolytes and replete PRN  - Continue to monitor strict ins and outs q1 hour    HEMATOLOGIC:  - DVT ppx: Lovenox     INFECTIOUS DISEASE:  - s/p ceftriaxone in ED on presentation  - monitor WBC  - f/u blood cx taken in ED  - started on zosyn 05/16    ENDOCRINOLOGY:  - No active issues  - Continue to monitor glucose on BMP (goal 140-180)    Disposition: SICU

## 2022-05-16 NOTE — BRIEF OPERATIVE NOTE - NSICDXBRIEFPROCEDURE_GEN_ALL_CORE_FT
PROCEDURES:  Cystoscopic insertion of left ureteral stent 18-May-2022 08:53:05  Elvia Gasca  Cystoscopy with retrograde pyelogram in adult 18-May-2022 08:53:21  Elvia Gasca

## 2022-05-16 NOTE — ED PROVIDER NOTE - NS ED ATTENDING STATEMENT MOD
I have seen and examined this patient and fully participated in the care of this patient as the teaching attending.  The service was shared with the KELVIN.  I reviewed and verified the documentation and independently performed the documented:

## 2022-05-16 NOTE — PROGRESS NOTE ADULT - ASSESSMENT
This 42 yo F admitted this AM with 5mm L. UPJ stone and fever 104.8; taken to OR emergently for stent; SICU consult called in PACU for soft B/P's; admitted to SICU  Plan:  - continue antibiotic (recieved CTX in ER)  - check cultures  - care as per SICU

## 2022-05-16 NOTE — ED PROVIDER NOTE - NSICDXPASTMEDICALHX_GEN_ALL_CORE_FT
PAST MEDICAL HISTORY:  Asthma     Dysmenorrhea     Endometriosis     Kidney stones     Ovarian cyst

## 2022-05-16 NOTE — ED PROVIDER NOTE - RESPIRATORY NEGATIVE STATEMENT, MLM
Detail Level: Detailed Post-Care Instructions: I reviewed with the patient in detail post-care instructions. Patient is to wear sunprotection, and avoid picking at any of the treated lesions. Pt may apply Vaseline to crusted or scabbing areas. Consent: The patient's consent was obtained including but not limited to risks of crusting, scabbing, blistering, scarring, darker or lighter pigmentary change, recurrence, incomplete removal and infection. Render Post-Care Instructions In Note?: no Duration Of Freeze Thaw-Cycle (Seconds): 0 no chest pain, no cough, and no shortness of breath.

## 2022-05-16 NOTE — PROGRESS NOTE ADULT - SUBJECTIVE AND OBJECTIVE BOX
Post op check    This 40 yo  is s/p L. stent for 5mm stone/fever to 104  PMH: asthma; stones  Pt is awake  Tmax this   B/P 80's/50's  Abd- soft  Bacon  Seen by SICU and accepted Post op check    This 40 yo  is s/p L. stent for 5mm stone/fever to 104  PMH: asthma; stones  Pt is awake  Tmax this   Afeb  B/P 80's/50's  Abd- soft  Bacon 150  Seen by SICU and accepted

## 2022-05-16 NOTE — CONSULT NOTE ADULT - ATTENDING COMMENTS
I agree with the detailed interval history, physical, and plan, which I have reviewed and edited where appropriate'; also agree with notes/assessment with my team on service.  I have personally examined the patient.  I was physically present for the key portions of the evaluation and management (E/M) service provided.  I reviewed all the pertinent data.  The patient is a critical care patient with life threatening hemodynamic and metabolic instability in SICU.  The SICU team has a constant risk benefit analyzes discussion and coordinating care with the primary team and all consultants.   The patient is in SICU with the chief complaint and diagnosis mentioned in the note.   The plan will be specified in the note.  41F with a proximal left ureteral stone sp stent placement; sp bolus and phenylephrine. SICU consulted for hemodynamic monitoring.   EXAM  NEUROLOGY  Exam:  Alert and oriented   RESPIRATORY  Exam: 3L NC, clear  CARDIOVASCULAR  Exam: Tachycardic on monitor.   GI/NUTRITION  Exam: Abdomen soft, Non-tender, Non-distended.    VASCULAR  Exam: Extremities warm.     PLAN:     NEUROLOGY:  - tylenol prn  RESPIRATORY:  - on 3L NC  -CARDIOVASCULAR:  - wean off phenylephrine as tolerated  -GI / NUTRITION:  - NPO  RENAL / GENITOURINARY:  - Indwelling cheng catheter  HEMATOLOGIC:  - DVT ppx: Lovenox   INFECTIOUS DISEASE:  - zosyn   ENDOCRINOLOGY:  - monitor glucose on BMP   Disposition: SICU

## 2022-05-16 NOTE — ED PROVIDER NOTE - CLINICAL SUMMARY MEDICAL DECISION MAKING FREE TEXT BOX
41F with PMH of asthma, endometriosis, kidney stones presenting with lower back pain x 2 days. Gradual onset yesterday to more constant and severe today. Has hx of infected kidney stone treated with lithotripsy. On PE, appears uncomfortable. Abdomen soft, TTP in LLQ with + L CVA tenderness. Impression: r/o kidney stone. Plan: IVF, pain control/antiemetic, labs, CT A/P

## 2022-05-16 NOTE — ED PROVIDER NOTE - NSICDXFAMILYHX_GEN_ALL_CORE_FT
FAMILY HISTORY:  Family history of hyperlipidemia, mother  Family history of hypothyroidism, father  Family history of sleep apnea, father    Grandparent  Still living? Unknown  Family history of coronary artery disease, Age at diagnosis: Age Unknown  Family history of diabetes mellitus, Age at diagnosis: Age Unknown

## 2022-05-16 NOTE — BRIEF OPERATIVE NOTE - OPERATION/FINDINGS
L ureteral stent placement, cheng placement, post op hypotension, SICU consult called, admitted to SICU for monitoring.

## 2022-05-16 NOTE — H&P ADULT - ATTENDING COMMENTS
She was seen and examined, above noted. Discussed emergent stent placement given high fever and obstructed stone. Risks of the procedure reviewed in detail (bleeding, infection, return to OR, malpositioning of the stent, injury to urethra/bladder/ureter, etc). Need for return to OR was discussed to remove the stone. She agrees to proceed.

## 2022-05-16 NOTE — ED ADULT NURSE NOTE - OBJECTIVE STATEMENT
Pt. presented to room 17. Pt. A&Ox4 ambulatory. Pt. c/o Pt. presented to room 17. Pt. A&Ox4 ambulatory. Pt. c/o left flank pain radiating to left abd. w/ nausea & vomiting. Pt. has had kidney stone in past and pain feels very similar. Pt. PMHx endometriosis sx in 2020. Pt. took motrin and THC at home w/ out pain relief. Pt. denies CP SOB diarrhea fever dysuria. LAC20G labs sent medicated per order.

## 2022-05-16 NOTE — ED PROVIDER NOTE - ATTENDING CONTRIBUTION TO CARE
7 Agree with above- pt with L flank pain and h/o nephrolithiasis p/w L flank pain with 5mm obstructing UPJ stone and +UA with WBX 20k. Plan for abx, pain control, uro consult.

## 2022-05-16 NOTE — ED ADULT NURSE NOTE - NSIMPLEMENTINTERV_GEN_ALL_ED
Implemented All Universal Safety Interventions:  [place] to call system. Call bell, personal items and telephone within reach. Instruct patient to call for assistance. Room bathroom lighting operational. Non-slip footwear when patient is off stretcher. Physically safe environment: no spills, clutter or unnecessary equipment. Stretcher in lowest position, wheels locked, appropriate side rails in place.

## 2022-05-17 LAB
ANION GAP SERPL CALC-SCNC: 9 MMOL/L — SIGNIFICANT CHANGE UP (ref 7–14)
BUN SERPL-MCNC: 10 MG/DL — SIGNIFICANT CHANGE UP (ref 7–23)
CALCIUM SERPL-MCNC: 7.9 MG/DL — LOW (ref 8.4–10.5)
CHLORIDE SERPL-SCNC: 110 MMOL/L — HIGH (ref 98–107)
CO2 SERPL-SCNC: 18 MMOL/L — LOW (ref 22–31)
CREAT SERPL-MCNC: 0.69 MG/DL — SIGNIFICANT CHANGE UP (ref 0.5–1.3)
EGFR: 112 ML/MIN/1.73M2 — SIGNIFICANT CHANGE UP
GLUCOSE SERPL-MCNC: 148 MG/DL — HIGH (ref 70–99)
HCT VFR BLD CALC: 30.1 % — LOW (ref 34.5–45)
HGB BLD-MCNC: 10 G/DL — LOW (ref 11.5–15.5)
MAGNESIUM SERPL-MCNC: 1.9 MG/DL — SIGNIFICANT CHANGE UP (ref 1.6–2.6)
MCHC RBC-ENTMCNC: 30.5 PG — SIGNIFICANT CHANGE UP (ref 27–34)
MCHC RBC-ENTMCNC: 33.2 GM/DL — SIGNIFICANT CHANGE UP (ref 32–36)
MCV RBC AUTO: 91.8 FL — SIGNIFICANT CHANGE UP (ref 80–100)
NRBC # BLD: 0 /100 WBCS — SIGNIFICANT CHANGE UP
NRBC # FLD: 0 K/UL — SIGNIFICANT CHANGE UP
PHOSPHATE SERPL-MCNC: 2.9 MG/DL — SIGNIFICANT CHANGE UP (ref 2.5–4.5)
PLATELET # BLD AUTO: 133 K/UL — LOW (ref 150–400)
POTASSIUM SERPL-MCNC: 3.6 MMOL/L — SIGNIFICANT CHANGE UP (ref 3.5–5.3)
POTASSIUM SERPL-SCNC: 3.6 MMOL/L — SIGNIFICANT CHANGE UP (ref 3.5–5.3)
RBC # BLD: 3.28 M/UL — LOW (ref 3.8–5.2)
RBC # FLD: 14 % — SIGNIFICANT CHANGE UP (ref 10.3–14.5)
SODIUM SERPL-SCNC: 137 MMOL/L — SIGNIFICANT CHANGE UP (ref 135–145)
WBC # BLD: 24.78 K/UL — HIGH (ref 3.8–10.5)
WBC # FLD AUTO: 24.78 K/UL — HIGH (ref 3.8–10.5)

## 2022-05-17 PROCEDURE — 99232 SBSQ HOSP IP/OBS MODERATE 35: CPT

## 2022-05-17 PROCEDURE — 99232 SBSQ HOSP IP/OBS MODERATE 35: CPT | Mod: GC

## 2022-05-17 RX ORDER — POTASSIUM CHLORIDE 20 MEQ
20 PACKET (EA) ORAL ONCE
Refills: 0 | Status: COMPLETED | OUTPATIENT
Start: 2022-05-17 | End: 2022-05-17

## 2022-05-17 RX ORDER — SODIUM CHLORIDE 9 MG/ML
1000 INJECTION, SOLUTION INTRAVENOUS
Refills: 0 | Status: DISCONTINUED | OUTPATIENT
Start: 2022-05-17 | End: 2022-05-18

## 2022-05-17 RX ORDER — IBUPROFEN 200 MG
600 TABLET ORAL ONCE
Refills: 0 | Status: COMPLETED | OUTPATIENT
Start: 2022-05-17 | End: 2022-05-17

## 2022-05-17 RX ORDER — KETOROLAC TROMETHAMINE 30 MG/ML
15 SYRINGE (ML) INJECTION EVERY 6 HOURS
Refills: 0 | Status: DISCONTINUED | OUTPATIENT
Start: 2022-05-17 | End: 2022-05-18

## 2022-05-17 RX ORDER — IBUPROFEN 200 MG
200 TABLET ORAL ONCE
Refills: 0 | Status: DISCONTINUED | OUTPATIENT
Start: 2022-05-17 | End: 2022-05-17

## 2022-05-17 RX ORDER — ACETAMINOPHEN 500 MG
650 TABLET ORAL ONCE
Refills: 0 | Status: COMPLETED | OUTPATIENT
Start: 2022-05-17 | End: 2022-05-17

## 2022-05-17 RX ORDER — MAGNESIUM SULFATE 500 MG/ML
1 VIAL (ML) INJECTION ONCE
Refills: 0 | Status: COMPLETED | OUTPATIENT
Start: 2022-05-17 | End: 2022-05-17

## 2022-05-17 RX ORDER — SODIUM CHLORIDE 9 MG/ML
500 INJECTION, SOLUTION INTRAVENOUS ONCE
Refills: 0 | Status: COMPLETED | OUTPATIENT
Start: 2022-05-17 | End: 2022-05-17

## 2022-05-17 RX ADMIN — Medication 975 MILLIGRAM(S): at 16:45

## 2022-05-17 RX ADMIN — Medication 600 MILLIGRAM(S): at 16:45

## 2022-05-17 RX ADMIN — Medication 15 MILLIGRAM(S): at 11:34

## 2022-05-17 RX ADMIN — Medication 100 GRAM(S): at 04:17

## 2022-05-17 RX ADMIN — Medication 650 MILLIGRAM(S): at 08:47

## 2022-05-17 RX ADMIN — SODIUM CHLORIDE 100 MILLILITER(S): 9 INJECTION, SOLUTION INTRAVENOUS at 20:10

## 2022-05-17 RX ADMIN — PIPERACILLIN AND TAZOBACTAM 25 GRAM(S): 4; .5 INJECTION, POWDER, LYOPHILIZED, FOR SOLUTION INTRAVENOUS at 13:33

## 2022-05-17 RX ADMIN — SODIUM CHLORIDE 1000 MILLILITER(S): 9 INJECTION, SOLUTION INTRAVENOUS at 18:22

## 2022-05-17 RX ADMIN — ENOXAPARIN SODIUM 40 MILLIGRAM(S): 100 INJECTION SUBCUTANEOUS at 16:15

## 2022-05-17 RX ADMIN — Medication 600 MILLIGRAM(S): at 16:13

## 2022-05-17 RX ADMIN — PIPERACILLIN AND TAZOBACTAM 25 GRAM(S): 4; .5 INJECTION, POWDER, LYOPHILIZED, FOR SOLUTION INTRAVENOUS at 05:50

## 2022-05-17 RX ADMIN — Medication 975 MILLIGRAM(S): at 16:08

## 2022-05-17 RX ADMIN — SODIUM CHLORIDE 100 MILLILITER(S): 9 INJECTION, SOLUTION INTRAVENOUS at 07:28

## 2022-05-17 RX ADMIN — Medication 50 MILLIEQUIVALENT(S): at 04:16

## 2022-05-17 RX ADMIN — PIPERACILLIN AND TAZOBACTAM 25 GRAM(S): 4; .5 INJECTION, POWDER, LYOPHILIZED, FOR SOLUTION INTRAVENOUS at 21:12

## 2022-05-17 RX ADMIN — Medication 15 MILLIGRAM(S): at 11:07

## 2022-05-17 RX ADMIN — Medication 650 MILLIGRAM(S): at 08:20

## 2022-05-17 NOTE — PROGRESS NOTE ADULT - ASSESSMENT
ASSESSMENT: 41yFemale p/w 5mm obstructing L ureteral stone with Tmax 104.8 now s/p L ureteral stent placement complicated by hypotension requiring pressors and SICU admission, doing well post op, weaned off pressors.     PLAN:  Diet: Per SICU  Pain control  Monitor Urine Output with Bacon, continue until 24 hours afebrile  DVT ppx  Cont Zosyn until cultures result  Monitor BP for pressor requirement  Monitor fevers  OOB/IS  Plan discussed with attending

## 2022-05-17 NOTE — PROGRESS NOTE ADULT - SUBJECTIVE AND OBJECTIVE BOX
POST ANESTHESIA EVALUATION    41y Female POSTOP DAY 1 S/P     MENTAL STATUS: Patient participation [  x] Awake     [  ] Arousable     [  ] Sedated    AIRWAY PATENCY: [ x ] Satisfactory  [  ] Other:     Vital Signs Last 24 Hrs  T(C): 37.3 (17 May 2022 08:00), Max: 40.4 (16 May 2022 11:50)  T(F): 99.1 (17 May 2022 08:00), Max: 104.8 (16 May 2022 11:50)  HR: 82 (17 May 2022 10:00) (60 - 108)  BP: 94/66 (16 May 2022 15:40) (87/72 - 109/65)  BP(mean): 73 (16 May 2022 15:40) (70 - 73)  RR: 21 (17 May 2022 10:00) (15 - 24)  SpO2: 96% (17 May 2022 10:00) (92% - 100%)  I&O's Summary    16 May 2022 07:01  -  17 May 2022 07:00  --------------------------------------------------------  IN: 4000 mL / OUT: 1785 mL / NET: 2215 mL    17 May 2022 07:01  -  17 May 2022 10:31  --------------------------------------------------------  IN: 100 mL / OUT: 50 mL / NET: 50 mL          NAUSEA/ VOMITTING:  [ x ] NONE  [  ] CONTROLLED [  ] OTHER     PAIN: [x  ] CONTROLLED WITH CURRENT REGIMEN  [  ] OTHER    [ x ] NO APPARENT ANESTHESIA COMPLICATIONS      Comments:

## 2022-05-17 NOTE — PROGRESS NOTE ADULT - SUBJECTIVE AND OBJECTIVE BOX
24 HOUR EVENTS:  - s/p OR for emergent stent for septic stone  - weaned off pressors in evening  - hypotensive overnight, 250 bolus LR, responded    SUBJECTIVE/ROS: A ten-point review of systems was otherwise negative except as noted.    NEURO  Exam: NAD, alert, oriented, PERRLA  Meds: acetaminophen     Tablet .. 975 milliGRAM(s) Oral every 6 hours PRN Mild Pain (1 - 3)      RESPIRATORY  RR: 18 (05-16-22 @ 23:00) (15 - 23)  SpO2: 92% (05-16-22 @ 23:00) (92% - 100%)  Wt(kg): --  Exam: unlabored respirations, equal chest rise bilaterally  ABG - ( 16 May 2022 16:01 )  pH: 7.41  /  pCO2: 27    /  pO2: 74    / HCO3: 17    / Base Excess: -6.3  /  SaO2: 96.1    Lactate: x        Meds: ALBUTerol    90 MICROgram(s) HFA Inhaler 2 Puff(s) Inhalation every 6 hours PRN SOB      CARDIOVASCULAR  HR: 70 (05-16-22 @ 23:00) (65 - 108)  BP: 94/66 (05-16-22 @ 15:40) (87/72 - 111/61)  BP(mean): 73 (05-16-22 @ 15:40) (70 - 73)  ABP: 108/69 (05-16-22 @ 23:00) (86/53 - 108/69)  ABP(mean): 85 (05-16-22 @ 23:00) (65 - 87)  Wt(kg): --  CVP(cm H2O): --    Exam: S1, S2.  RRR  Perfusion     [X]Adequate   [ ]Inadequate  Mentation   [X]Normal       [ ]Reduced  Extremities  [X]Warm         [ ]Cool  Volume Status [ ]Hypervolemic [X]Euvolemic [ ]Hypovolemic    GI/NUTRITION  Exam: Abdomen soft, Non-tender, Non-distended.    Diet: NPO    GENITOURINARY  I&O's Detail    05-16 @ 07:01  -  05-17 @ 00:54  --------------------------------------------------------  IN:    IV PiggyBack: 400 mL    Lactated Ringers: 1700 mL    Lactated Ringers Bolus: 1000 mL  Total IN: 3100 mL    OUT:    Indwelling Catheter - Urethral (mL): 1340 mL  Total OUT: 1340 mL    Total NET: 1760 mL        Weight (kg): 65.9 (05-16 @ 11:50)  05-16    142  |  114<H>  |  14  ----------------------------<  102<H>  3.3<L>   |  16<L>  |  0.83    Ca    7.8<L>      16 May 2022 16:01  Phos  1.5     05-16  Mg     1.00     05-16    TPro  7.2  /  Alb  4.1  /  TBili  0.5  /  DBili  x   /  AST  15  /  ALT  11  /  AlkPhos  40  05-16    Meds: lactated ringers. 1000 milliLiter(s) IV Continuous <Continuous>      HEMATOLOGIC  Meds: enoxaparin Injectable 40 milliGRAM(s) SubCutaneous every 24 hours                          10.7   15.91 )-----------( 140      ( 16 May 2022 16:01 )             31.3         INFECTIOUS DISEASES  T(C): 36.9 (05-16-22 @ 20:00), Max: 40.4 (05-16-22 @ 11:50)  Wt(kg): --    Meds: piperacillin/tazobactam IVPB.. 3.375 Gram(s) IV Intermittent every 8 hours      ACCESS DEVICES:  [x] Peripheral IV  [] Central Venous Line     	[] R	[] L	[] IJ	[] Fem	[] SC	Date Placed:   [X] Arterial Line		[] R	[] L	[] Fem	[] Rad	[] Ax	Date Placed: 05/16/2022  [] PICC		[] Midline		[] Mediport  [X] Urinary Catheter		Date Placed: 05/16/2022  [x] Necessity of urinary, arterial, and venous catheters discussed

## 2022-05-17 NOTE — PROGRESS NOTE ADULT - ASSESSMENT
ASSESSMENT:  41F with PMH of asthma, endometriosis, and an infected kidney stone presented to ED c/o lower back pain x 2 days . States pain feels similar to her previous kidney stones, last saw Dr. Dorman in 2016 . Patient reported nausea and 3-4 episodes of NB vomiting and subjective fevers at home. CT showed 7x5mm proximal left ureteral stone; was found to have leukocytosis and was febrile to 104. Patient brought to OR for stent placement with urology. Patient became hypotensive in OR requiring 2L bolus and phenylephrine. SICU consulted for hemodynamic monitoring.     PLAN:  NEUROLOGY:  - A&Ox4.   - Pain control with tylenol prn    RESPIRATORY:  - Maintaining O2 saturation > 92% on RA  - hx of asthma, continue home albuterol    CARDIOVASCULAR:  - weaned off pressors, required 250cc bolus overnight for hypotension  - maintain MAP >65  - lactate downtrending    GI / NUTRITION:  - NPO  - IVF     RENAL / GENITOURINARY:  - s/p OR for emergent stent for 5mm stone with urology 5/16  - Indwelling cheng catheter  - Monitor electrolytes and replete PRN  - Continue to monitor strict ins and outs q1 hour  - c/w LR @ 100    HEMATOLOGIC:  - DVT ppx: Lovenox     INFECTIOUS DISEASE:  - s/p ceftriaxone in ED on presentation  - monitor WBC  - f/u BCx (5/16 pending), UCx (5/16 pending)  - started on zosyn 05/16    ENDOCRINOLOGY:  - No active issues  - Continue to monitor glucose on BMP (goal 140-180)    Tubes/Lines/Drains:  PIVx2, L Radial Art (5/16- ), Cheng    DISPO: SICU   ASSESSMENT:  41F with PMH of asthma, endometriosis, and an infected kidney stone presented to ED c/o lower back pain x 2 days . States pain feels similar to her previous kidney stones, last saw Dr. Dorman in 2016 . Patient reported nausea and 3-4 episodes of NB vomiting and subjective fevers at home. CT showed 7x5mm proximal left ureteral stone; was found to have leukocytosis and was febrile to 104. Patient brought to OR for stent placement with urology. Patient became hypotensive in OR requiring 2L bolus and phenylephrine. SICU consulted for hemodynamic monitoring.     PLAN:  NEUROLOGY:  - A&Ox4.   - Pain control with tylenol prn    RESPIRATORY:  - Maintaining O2 saturation > 92% on RA  - hx of asthma, continue home albuterol  - OOB today    CARDIOVASCULAR:  - weaned off pressors, required 250cc bolus overnight for hypotension  - maintain MAP >65  - lactate downtrending    GI / NUTRITION:  - NPO, advance diet today  - IVF     RENAL / GENITOURINARY:  - s/p OR for emergent stent for 5mm stone with urology 5/16  - Indwelling cheng catheter - per urology will dc after 24hrs afebrile   - Monitor electrolytes and replete PRN  - Continue to monitor strict ins and outs q1 hour  - c/w LR @ 100    HEMATOLOGIC:  - DVT ppx: Lovenox     INFECTIOUS DISEASE:  - s/p ceftriaxone in ED on presentation  - monitor WBC  - f/u BCx (5/16 pending), UCx (5/16 pending)  - started on zosyn 05/16    ENDOCRINOLOGY:  - No active issues  - Continue to monitor glucose on BMP (goal 140-180)    Tubes/Lines/Drains:  PIVx2, dc L Radial Art (5/16- ), dc Cheng this afternoon    DISPO: to be listed for floors

## 2022-05-17 NOTE — PROGRESS NOTE ADULT - SUBJECTIVE AND OBJECTIVE BOX
INTERVAL HPI/OVERNIGHT EVENTS:  No acute events overnight. Houser is doing well overnight, off pressors, has been Afebrile since procedure, feeling better overall.    VITALS:    T(F): 99.1 (22 @ 08:00), Max: 104.8 (22 @ 11:50)  HR: 73 (22 @ 08:00) (60 - 108)  BP: 94/66 (22 @ 15:40) (87/72 - 109/65)  RR: 24 (22 @ 08:00) (15 - 24)  SpO2: 95% (22 @ 08:00) (92% - 100%)  Wt(kg): --    I&O's Detail    16 May 2022 07:01  -  17 May 2022 07:00  --------------------------------------------------------  IN:    IV PiggyBack: 500 mL    Lactated Ringers: 2500 mL    Lactated Ringers Bolus: 1000 mL  Total IN: 4000 mL    OUT:    Indwelling Catheter - Urethral (mL): 1785 mL  Total OUT: 1785 mL    Total NET: 2215 mL      17 May 2022 07:01  -  17 May 2022 08:40  --------------------------------------------------------  IN:    Lactated Ringers: 100 mL  Total IN: 100 mL    OUT:    Indwelling Catheter - Urethral (mL): 50 mL  Total OUT: 50 mL    Total NET: 50 mL          MEDICATIONS:    ANTIBIOTICS:  piperacillin/tazobactam IVPB.. 3.375 Gram(s) IV Intermittent every 8 hours      PAIN CONTROL:  acetaminophen     Tablet .. 975 milliGRAM(s) Oral every 6 hours PRN       MEDS:      HEME/ONC  enoxaparin Injectable 40 milliGRAM(s) SubCutaneous every 24 hours        PHYSICAL EXAM:  General: No acute distress.  Alert and Oriented  Abdominal Exam: non tender, nondistended   : cheng in place with clear urine        LABS:                        10.0   24.78 )-----------( 133      ( 17 May 2022 01:30 )             30.1     -    137  |  110<H>  |  10  ----------------------------<  148<H>  3.6   |  18<L>  |  0.69    Ca    7.9<L>      17 May 2022 01:30  Phos  2.9       Mg     1.90         TPro  7.2  /  Alb  4.1  /  TBili  0.5  /  DBili  x   /  AST  15  /  ALT  11  /  AlkPhos  40  05-16      Urinalysis Basic - ( 16 May 2022 07:50 )    Color: Yellow / Appearance: Clear / S.031 / pH: x  Gluc: x / Ketone: Negative  / Bili: Negative / Urobili: <2 mg/dL   Blood: x / Protein: Trace / Nitrite: Negative   Leuk Esterase: Large / RBC: 2-5 /HPF / WBC 30-50 /HPF   Sq Epi: x / Non Sq Epi: x / Bacteria: x        RADIOLOGY & ADDITIONAL TESTS:

## 2022-05-17 NOTE — PROGRESS NOTE ADULT - ATTENDING COMMENTS
Patient was seen and examined, mother at bedside. Bacon was removed. She had another fever 101. Awaiting final culture sensitivities. Appreciate ICU follow up.
I agree with the history, physical, and plan, which I have reviewed and edited where appropriate.  I agree with notes/assessment of health care providers on my service.  I have personally examined the patient.  I was physically present for the key portions of the evaluation and management (E/M) service provided.  I reviewed data and laboratory tests/x-rays and all pertinent electronic images.  The patient is a critical care patient with life threatening hemodynamic and metabolic instability in SICU.  Risk benefit analyses discussed.    The patient is in SICU with diagnosis mentioned in the note.    The plan is specified below.    ASSESSMENT:  41F with PMH of asthma, endometriosis, and an infected kidney stone presented to ED c/o lower back pain x 2 days . States pain feels similar to her previous kidney stones, last saw Dr. Dorman in 2016 . Patient reported nausea and 3-4 episodes of NB vomiting and subjective fevers at home. CT showed 7x5mm proximal left ureteral stone; was found to have leukocytosis and was febrile to 104. Patient brought to OR for stent placement with urology. Patient became hypotensive in OR requiring 2L bolus and phenylephrine. SICU consulted for hemodynamic monitoring. Stable off vasopressors since 4am. Stable for transfer to floor.     PLAN:  NEUROLOGY:  - A&Ox4.   - Pain control with tylenol prn    RESPIRATORY: hx of asthma  - on RA  - Continue home albuterol  - OOB today    CARDIOVASCULAR:  - off pressors    GI / NUTRITION:  - Reg diet  - IVF     RENAL / GENITOURINARY: s/p OR for emergent stent for 5mm stone with urology 5/16  - Indwelling cheng catheter - per urology will dc after 24hrs afebrile   - c/w LR @ 100    HEMATOLOGIC:  - DVT ppx: Lovenox     INFECTIOUS DISEASE: obstructing ureteral stone, sepsis   - f/u BCx (5/16 pending), UCx (5/16 pending)  - started on zosyn 05/16    ENDOCRINOLOGY:  - Continue to monitor glucose on BMP

## 2022-05-18 DIAGNOSIS — A49.8 OTHER BACTERIAL INFECTIONS OF UNSPECIFIED SITE: ICD-10-CM

## 2022-05-18 DIAGNOSIS — Z29.9 ENCOUNTER FOR PROPHYLACTIC MEASURES, UNSPECIFIED: ICD-10-CM

## 2022-05-18 DIAGNOSIS — N13.9 OBSTRUCTIVE AND REFLUX UROPATHY, UNSPECIFIED: ICD-10-CM

## 2022-05-18 DIAGNOSIS — J45.909 UNSPECIFIED ASTHMA, UNCOMPLICATED: ICD-10-CM

## 2022-05-18 LAB
-  AMIKACIN: SIGNIFICANT CHANGE UP
-  AMIKACIN: SIGNIFICANT CHANGE UP
-  AMOXICILLIN/CLAVULANIC ACID: SIGNIFICANT CHANGE UP
-  AMOXICILLIN/CLAVULANIC ACID: SIGNIFICANT CHANGE UP
-  AMPICILLIN/SULBACTAM: SIGNIFICANT CHANGE UP
-  AMPICILLIN/SULBACTAM: SIGNIFICANT CHANGE UP
-  AMPICILLIN: SIGNIFICANT CHANGE UP
-  AMPICILLIN: SIGNIFICANT CHANGE UP
-  AZTREONAM: SIGNIFICANT CHANGE UP
-  AZTREONAM: SIGNIFICANT CHANGE UP
-  CEFAZOLIN: SIGNIFICANT CHANGE UP
-  CEFAZOLIN: SIGNIFICANT CHANGE UP
-  CEFEPIME: SIGNIFICANT CHANGE UP
-  CEFEPIME: SIGNIFICANT CHANGE UP
-  CEFOXITIN: SIGNIFICANT CHANGE UP
-  CEFOXITIN: SIGNIFICANT CHANGE UP
-  CEFTRIAXONE: SIGNIFICANT CHANGE UP
-  CEFTRIAXONE: SIGNIFICANT CHANGE UP
-  CIPROFLOXACIN: SIGNIFICANT CHANGE UP
-  CIPROFLOXACIN: SIGNIFICANT CHANGE UP
-  ERTAPENEM: SIGNIFICANT CHANGE UP
-  ERTAPENEM: SIGNIFICANT CHANGE UP
-  GENTAMICIN: SIGNIFICANT CHANGE UP
-  GENTAMICIN: SIGNIFICANT CHANGE UP
-  LEVOFLOXACIN: SIGNIFICANT CHANGE UP
-  LEVOFLOXACIN: SIGNIFICANT CHANGE UP
-  MEROPENEM: SIGNIFICANT CHANGE UP
-  MEROPENEM: SIGNIFICANT CHANGE UP
-  NITROFURANTOIN: SIGNIFICANT CHANGE UP
-  PIPERACILLIN/TAZOBACTAM: SIGNIFICANT CHANGE UP
-  PIPERACILLIN/TAZOBACTAM: SIGNIFICANT CHANGE UP
-  TOBRAMYCIN: SIGNIFICANT CHANGE UP
-  TOBRAMYCIN: SIGNIFICANT CHANGE UP
-  TRIMETHOPRIM/SULFAMETHOXAZOLE: SIGNIFICANT CHANGE UP
-  TRIMETHOPRIM/SULFAMETHOXAZOLE: SIGNIFICANT CHANGE UP
ANION GAP SERPL CALC-SCNC: 9 MMOL/L — SIGNIFICANT CHANGE UP (ref 7–14)
BUN SERPL-MCNC: 11 MG/DL — SIGNIFICANT CHANGE UP (ref 7–23)
CALCIUM SERPL-MCNC: 8.2 MG/DL — LOW (ref 8.4–10.5)
CHLORIDE SERPL-SCNC: 106 MMOL/L — SIGNIFICANT CHANGE UP (ref 98–107)
CO2 SERPL-SCNC: 20 MMOL/L — LOW (ref 22–31)
CREAT SERPL-MCNC: 0.74 MG/DL — SIGNIFICANT CHANGE UP (ref 0.5–1.3)
CULTURE RESULTS: SIGNIFICANT CHANGE UP
CULTURE RESULTS: SIGNIFICANT CHANGE UP
EGFR: 105 ML/MIN/1.73M2 — SIGNIFICANT CHANGE UP
GLUCOSE SERPL-MCNC: 123 MG/DL — HIGH (ref 70–99)
HCT VFR BLD CALC: 31.8 % — LOW (ref 34.5–45)
HGB BLD-MCNC: 10.7 G/DL — LOW (ref 11.5–15.5)
MAGNESIUM SERPL-MCNC: 2 MG/DL — SIGNIFICANT CHANGE UP (ref 1.6–2.6)
MCHC RBC-ENTMCNC: 30.6 PG — SIGNIFICANT CHANGE UP (ref 27–34)
MCHC RBC-ENTMCNC: 33.6 GM/DL — SIGNIFICANT CHANGE UP (ref 32–36)
MCV RBC AUTO: 90.9 FL — SIGNIFICANT CHANGE UP (ref 80–100)
METHOD TYPE: SIGNIFICANT CHANGE UP
METHOD TYPE: SIGNIFICANT CHANGE UP
NRBC # BLD: 0 /100 WBCS — SIGNIFICANT CHANGE UP
NRBC # FLD: 0 K/UL — SIGNIFICANT CHANGE UP
ORGANISM # SPEC MICROSCOPIC CNT: SIGNIFICANT CHANGE UP
PHOSPHATE SERPL-MCNC: 1.6 MG/DL — LOW (ref 2.5–4.5)
PLATELET # BLD AUTO: 121 K/UL — LOW (ref 150–400)
POTASSIUM SERPL-MCNC: 3.9 MMOL/L — SIGNIFICANT CHANGE UP (ref 3.5–5.3)
POTASSIUM SERPL-SCNC: 3.9 MMOL/L — SIGNIFICANT CHANGE UP (ref 3.5–5.3)
RBC # BLD: 3.5 M/UL — LOW (ref 3.8–5.2)
RBC # FLD: 14.5 % — SIGNIFICANT CHANGE UP (ref 10.3–14.5)
SODIUM SERPL-SCNC: 135 MMOL/L — SIGNIFICANT CHANGE UP (ref 135–145)
SPECIMEN SOURCE: SIGNIFICANT CHANGE UP
SPECIMEN SOURCE: SIGNIFICANT CHANGE UP
WBC # BLD: 13.31 K/UL — HIGH (ref 3.8–10.5)
WBC # FLD AUTO: 13.31 K/UL — HIGH (ref 3.8–10.5)

## 2022-05-18 PROCEDURE — 99223 1ST HOSP IP/OBS HIGH 75: CPT

## 2022-05-18 PROCEDURE — 99232 SBSQ HOSP IP/OBS MODERATE 35: CPT

## 2022-05-18 PROCEDURE — 99231 SBSQ HOSP IP/OBS SF/LOW 25: CPT

## 2022-05-18 RX ORDER — SODIUM,POTASSIUM PHOSPHATES 278-250MG
1 POWDER IN PACKET (EA) ORAL ONCE
Refills: 0 | Status: COMPLETED | OUTPATIENT
Start: 2022-05-18 | End: 2022-05-18

## 2022-05-18 RX ORDER — ACETAMINOPHEN 500 MG
1000 TABLET ORAL ONCE
Refills: 0 | Status: COMPLETED | OUTPATIENT
Start: 2022-05-18 | End: 2022-05-18

## 2022-05-18 RX ORDER — IBUPROFEN 200 MG
200 TABLET ORAL EVERY 6 HOURS
Refills: 0 | Status: DISCONTINUED | OUTPATIENT
Start: 2022-05-18 | End: 2022-05-20

## 2022-05-18 RX ORDER — IBUPROFEN 200 MG
600 TABLET ORAL ONCE
Refills: 0 | Status: COMPLETED | OUTPATIENT
Start: 2022-05-18 | End: 2022-05-18

## 2022-05-18 RX ORDER — ACETAMINOPHEN 500 MG
650 TABLET ORAL EVERY 6 HOURS
Refills: 0 | Status: DISCONTINUED | OUTPATIENT
Start: 2022-05-18 | End: 2022-05-20

## 2022-05-18 RX ADMIN — Medication 15 MILLIGRAM(S): at 02:55

## 2022-05-18 RX ADMIN — Medication 400 MILLIGRAM(S): at 23:36

## 2022-05-18 RX ADMIN — Medication 15 MILLIGRAM(S): at 05:12

## 2022-05-18 RX ADMIN — Medication 1 PACKET(S): at 13:33

## 2022-05-18 RX ADMIN — PIPERACILLIN AND TAZOBACTAM 25 GRAM(S): 4; .5 INJECTION, POWDER, LYOPHILIZED, FOR SOLUTION INTRAVENOUS at 23:24

## 2022-05-18 RX ADMIN — SODIUM CHLORIDE 100 MILLILITER(S): 9 INJECTION, SOLUTION INTRAVENOUS at 06:55

## 2022-05-18 RX ADMIN — Medication 200 MILLIGRAM(S): at 15:32

## 2022-05-18 RX ADMIN — PIPERACILLIN AND TAZOBACTAM 25 GRAM(S): 4; .5 INJECTION, POWDER, LYOPHILIZED, FOR SOLUTION INTRAVENOUS at 06:55

## 2022-05-18 RX ADMIN — PIPERACILLIN AND TAZOBACTAM 25 GRAM(S): 4; .5 INJECTION, POWDER, LYOPHILIZED, FOR SOLUTION INTRAVENOUS at 13:32

## 2022-05-18 RX ADMIN — Medication 600 MILLIGRAM(S): at 17:20

## 2022-05-18 RX ADMIN — ENOXAPARIN SODIUM 40 MILLIGRAM(S): 100 INJECTION SUBCUTANEOUS at 13:33

## 2022-05-18 RX ADMIN — Medication 15 MILLIGRAM(S): at 08:00

## 2022-05-18 RX ADMIN — Medication 650 MILLIGRAM(S): at 07:19

## 2022-05-18 RX ADMIN — Medication 85 MILLIMOLE(S): at 08:06

## 2022-05-18 NOTE — CHART NOTE - NSCHARTNOTEFT_GEN_A_CORE
Patient to be transferred from SICU to [facility]. Sign out given to overnight Urology PA.

## 2022-05-18 NOTE — PROGRESS NOTE ADULT - SUBJECTIVE AND OBJECTIVE BOX
SICU Daily Progress Note  =====================================================  Interval/Overnight Events:      Started on maintenance fluids      HPI:  41F with PMH of asthma, endometriosis, and an infected kidney stone presented to ED c/o lower back pain x 2 days . States pain feels similar to her previous kidney stones, last saw Dr. Dorman in 2016 . Patient reported nausea and 3-4 episodes of NB vomiting and subjective fevers at home. CT showed 7x5mm proximal left ureteral stone; was found to have leukocytosis and was febrile to 104. Patient brought to OR for stent placement. SICU consulted for hemodynamic monitoring s/p left stent placement for 5mm kidney stone. Patient became hypotensive in OR requiring 2L bolus and phenylephrine.         Allergies: milk (Rash)  Neosporin (Unknown)      MEDICATIONS:   --------------------------------------------------------------------------------------  Neurologic Medications  acetaminophen     Tablet .. 975 milliGRAM(s) Oral every 6 hours PRN Mild Pain (1 - 3)  ketorolac   Injectable 15 milliGRAM(s) IV Push every 6 hours    Respiratory Medications  ALBUTerol    90 MICROgram(s) HFA Inhaler 2 Puff(s) Inhalation every 6 hours PRN SOB    Cardiovascular Medications    Gastrointestinal Medications  lactated ringers. 1000 milliLiter(s) IV Continuous <Continuous>    Genitourinary Medications    Hematologic/Oncologic Medications  enoxaparin Injectable 40 milliGRAM(s) SubCutaneous every 24 hours    Antimicrobial/Immunologic Medications  piperacillin/tazobactam IVPB.. 3.375 Gram(s) IV Intermittent every 8 hours    Endocrine/Metabolic Medications    Topical/Other Medications    --------------------------------------------------------------------------------------    VITAL SIGNS, INS/OUTS (last 24 hours):  --------------------------------------------------------------------------------------  Vital Signs Last 24 Hrs  T(C): 36.4 (18 May 2022 00:00), Max: 38.4 (17 May 2022 16:00)  T(F): 97.6 (18 May 2022 00:00), Max: 101.1 (17 May 2022 16:00)  HR: 72 (18 May 2022 02:00) (60 - 110)  BP: 110/80 (18 May 2022 02:00) (77/40 - 117/68)  BP(mean): 87 (18 May 2022 02:00) (50 - 87)  RR: 15 (18 May 2022 02:00) (12 - 25)  SpO2: 96% (18 May 2022 02:00) (93% - 99%)    I&O's Detail    16 May 2022 07:01  -  17 May 2022 07:00  --------------------------------------------------------  IN:    IV PiggyBack: 500 mL    Lactated Ringers: 2500 mL    Lactated Ringers Bolus: 1000 mL  Total IN: 4000 mL    OUT:    Indwelling Catheter - Urethral (mL): 1785 mL  Total OUT: 1785 mL    Total NET: 2215 mL      17 May 2022 07:01  -  18 May 2022 02:51  --------------------------------------------------------  IN:    IV PiggyBack: 400 mL    Lactated Ringers: 200 mL    Lactated Ringers: 600 mL    Lactated Ringers Bolus: 500 mL  Total IN: 1700 mL    OUT:    Indwelling Catheter - Urethral (mL): 395 mL    Voided (mL): 850 mL  Total OUT: 1245 mL    Total NET: 455 mL    --------------------------------------------------------------------------------------    EXAM  NEUROLOGY  Exam: Normal, NAD, alert, oriented x3, no focal deficits.     HEENT  Exam: Normocephalic, atraumatic, EOMI.      RESPIRATORY  Exam: Lungs clear to auscultation, Normal expansion/effort.       CARDIOVASCULAR  Exam: S1, S2.  Regular rate and rhythm.     GI/NUTRITION  Exam: Abdomen soft, Non-tender, Non-distended.    Current Diet:  Regular    VASCULAR  Exam: Extremities warm, pink, well-perfused.    MUSCULOSKELETAL  Exam: All extremities moving spontaneously without limitations.     SKIN  Exam: Good skin turgor, no skin breakdown. ***    METABOLIC/FLUIDS/ELECTROLYTES  lactated ringers. 1000 milliLiter(s) IV Continuous <Continuous>      HEMATOLOGIC  [x] VTE Prophylaxis: enoxaparin Injectable 40 milliGRAM(s) SubCutaneous every 24 hours    Transfusions:	[] PRBC	[] Platelets		[] FFP	[] Cryoprecipitate    INFECTIOUS DISEASE  Antimicrobials/Immunologic Medications:  piperacillin/tazobactam IVPB.. 3.375 Gram(s) IV Intermittent every 8 hours      Tubes/Lines/Drains  ***  [x] Peripheral IV  [] Central Venous Line     	[] R	[] L	[] IJ	[] Fem	[] SC	Date Placed:   [] Arterial Line		[] R	[] L	[] Fem	[] Rad	[] Ax	Date Placed:   [] PICC		[] Midline		[] Mediport  [] Urinary Catheter		Date Placed:   [x] Necessity of urinary, arterial, and venous catheters discussed    LABS  --------------------------------------------------------------------------------------                        10.7   13.31 )-----------( 121      ( 18 May 2022 01:27 )             31.8     05-18    135  |  106  |  11  ----------------------------<  123<H>  3.9   |  20<L>  |  0.74    Ca    8.2<L>      18 May 2022 01:27  Phos  1.6     05-18  Mg     2.00     05-18    TPro  7.2  /  Alb  4.1  /  TBili  0.5  /  DBili  x   /  AST  15  /  ALT  11  /  AlkPhos  40  05-16        --------------------------------------------------------------------------------------    OTHER LABORATORY:     IMAGING STUDIES:     CXR:  Pending     ASSESSMENT:  41F with PMH of asthma, endometriosis, and an infected kidney stone presented to ED c/o lower back pain x 2 days . States pain feels similar to her previous kidney stones, last saw Dr. Dorman in 2016 . Patient reported nausea and 3-4 episodes of NB vomiting and subjective fevers at home. CT showed 7x5mm proximal left ureteral stone; was found to have leukocytosis and was febrile to 104. Patient brought to OR for stent placement with urology. Patient became hypotensive in OR requiring 2L bolus and phenylephrine. SICU consulted for hemodynamic monitoring.     PLAN:  NEUROLOGY:  - A&Ox4.   - Pain control with tylenol prn    RESPIRATORY:  - Maintaining O2 saturation > 92% on RA  - hx of asthma, continue home albuterol  - OOB today    CARDIOVASCULAR:  - weaned off pressors, required 250cc bolus overnight for hypotension  - maintain MAP >65  - lactate downtrending    GI / NUTRITION:  - Regular diet  - IVF     RENAL / GENITOURINARY:  - s/p OR for emergent stent for 5mm stone with urology 5/16  - Monitor electrolytes and replete PRN  - c/w LR @ 100    HEMATOLOGIC:  - DVT ppx: Lovenox     INFECTIOUS DISEASE:  - s/p ceftriaxone in ED on presentation  - monitor WBC  - f/u BCx (5/16 pending), UCx (5/16 pending)  - started on zosyn 05/16    ENDOCRINOLOGY:  - No active issues  - Continue to monitor glucose on BMP (goal 140-180)    Tubes/Lines/Drains:  PIVx2    DISPO: [address] [place]    --------------------------------------------------------------------------------------    Critical Care Diagnoses:

## 2022-05-18 NOTE — CONSULT NOTE ADULT - PROBLEM SELECTOR RECOMMENDATION 9
5mm stone at UPJ now s/p cystoscopy with retrograde pyelogram and left ureteral stent placement by Urology on 5/18/22  - Urine culture with proteus, blood cultures NGTD, continues on zosyn. follow up OR fluid cultures  - Fever curve downtrending, continue to monitor  - renal function intact  - Continued management per urology

## 2022-05-18 NOTE — CONSULT NOTE ADULT - SUBJECTIVE AND OBJECTIVE BOX
SICU Consult Note  =====================================================  Interval Events:  SICU consulted for hemodynamic monitoring s/p left stent placement for 5mm kidney stone. Patient became hypotensive in OR requiring 2L bolus and phenylephrine.       HPI:  41F with PMH of asthma, endometriosis, and an infected kidney stone presented to ED c/o lower back pain x 2 days . States pain feels similar to her previous kidney stones, last saw Dr. Dorman in 2016 . Patient reported nausea and 3-4 episodes of NB vomiting and subjective fevers at home. CT showed 7x5mm proximal left ureteral stone; was found to have leukocytosis and was febrile to 104. Patient brought to OR for stent placement. SICU consulted for hemodynamic monitoring s/p left stent placement for 5mm kidney stone. Patient became hypotensive in OR requiring 2L bolus and phenylephrine.     PAST MEDICAL & SURGICAL HISTORY:  Dysmenorrhea  Endometriosis  Asthma  Kidney stones  Ovarian cyst  H/O exploratory laparotomy 12/18/2012      ALLERGIES:  milk (Rash)  Neosporin (Unknown)    --------------------------------------------------------------------------------------    MEDICATIONS:    Neurologic Medications  fentaNYL    Injectable 25 MICROGram(s) IV Push every 5 minutes PRN Moderate Pain (4 - 6)  ondansetron Injectable 4 milliGRAM(s) IV Push once PRN Nausea and/or Vomiting      Antimicrobial/Immunologic Medications  piperacillin/tazobactam IVPB. 3.375 Gram(s) IV Intermittent once  piperacillin/tazobactam IVPB.. 3.375 Gram(s) IV Intermittent every 8 hours      --------------------------------------------------------------------------------------    VITAL SIGNS:  ICU Vital Signs Last 24 Hrs  T(C): 37.1 (16 May 2022 15:15), Max: 40.4 (16 May 2022 11:50)  T(F): 98.8 (16 May 2022 15:15), Max: 104.8 (16 May 2022 11:50)  HR: 93 (16 May 2022 15:25) (65 - 108)  BP: 97/62 (16 May 2022 15:20) (87/72 - 111/61)  BP(mean): 70 (16 May 2022 15:20) (70 - 70)  RR: 18 (16 May 2022 15:25) (16 - 23)  SpO2: 99% (16 May 2022 15:25) (98% - 100%)    --------------------------------------------------------------------------------------        EXAM  NEUROLOGY  Exam:  Alert and oriented x4.  No focal neurologic deficits.     RESPIRATORY  Exam: 3L NC, no acute respiratory distress.  Normal expansion/effort.     CARDIOVASCULAR  Exam: S1, S2.  Tachycardic on monitor.     GI/NUTRITION  Exam: Abdomen soft, Non-tender, Non-distended.    Current Diet: NPO    VASCULAR  Exam: Extremities warm.     MUSCULOSKELETAL  Exam: All extremities moving spontaneously without limitations.       HEMATOLOGIC  [x] VTE Prophylaxis:   Transfusions:	[] PRBC	[] Platelets		[] FFP	[] Cryoprecipitate    INFECTIOUS DISEASE  Antimicrobials/Immunologic Medications:  piperacillin/tazobactam IVPB. 3.375 Gram(s) IV Intermittent once  piperacillin/tazobactam IVPB.. 3.375 Gram(s) IV Intermittent every 8 hours        TUBES / LINES / DRAINS  ***  [x] Peripheral IV  [] Central Venous Line     	[] R	[] L	[] IJ	[] Fem	[] SC	Date Placed:   [X] Arterial Line		[] R	[] L	[] Fem	[] Rad	[] Ax	Date Placed: 05/16/2022  [] PICC		[] Midline		[] Kathryn  [X] Urinary Catheter		Date Placed: 05/16/2022  [x] Necessity of urinary, arterial, and venous catheters discussed    --------------------------------------------------------------------------------------  
CHIEF COMPLAINT: Patient is a 41y old  Female who presents with a chief complaint of back pain/ kidney stone    HPI: 41 year old female with asthma, endometriosis, kidney stones, presented with lower back pain found to have obstructive uropathy with mild left hydro with 5mm stone at [facility] now s/p cystoscopy with retrograde pyelogram and left ureteral stent placement by Urology on 5/18/22, hospitalization complicated by fever to 104 and hypotension requiring SICU and pressors. Now downgraded out of SICU. Currently feeling overall improved from prior, but still overall fatigued. Having some lower back pain earlier which is now improved. Also reports intermittent headache, currently improved about 2/10 after tylenol/coffee. No nausea, vomiting, chest pain. Reports some shortness of breath with movement, not significant at rest. No rash.     Allergies  milk (Rash)  Neosporin (Unknown)    HOME MEDICATIONS: [x] Reviewed    MEDICATIONS  (STANDING):  enoxaparin Injectable 40 milliGRAM(s) SubCutaneous every 24 hours  lactated ringers. 1000 milliLiter(s) (100 mL/Hr) IV Continuous <Continuous>  piperacillin/tazobactam IVPB.. 3.375 Gram(s) IV Intermittent every 8 hours  potassium phosphate / sodium phosphate Powder (PHOS-NaK) 1 Packet(s) Oral once    MEDICATIONS  (PRN):  acetaminophen     Tablet .. 650 milliGRAM(s) Oral every 6 hours PRN Temp greater or equal to 38C (100.4F)  ALBUTerol    90 MICROgram(s) HFA Inhaler 2 Puff(s) Inhalation every 6 hours PRN SOB    PAST MEDICAL & SURGICAL HISTORY:  Dysmenorrhea  Endometriosis  Asthma  Kidney stones  Ovarian cyst  H/O exploratory laparotomy  12/18/2012  [X ] Reviewed     SOCIAL HISTORY:  no tobacco, drug use    FAMILY HISTORY:  Family history of hypothyroidism  father    Family history of sleep apnea  father    Family history of hyperlipidemia  mother    Family history of coronary artery disease (Grandparent)  maternal grandfather    Family history of diabetes mellitus (Grandparent)  maternal grandfather    REVIEW OF SYSTEMS:    [x] All other ROS negative    Vital Signs Last 24 Hrs  T(C): 38 (18 May 2022 09:31), Max: 38.4 (17 May 2022 16:00)  T(F): 100.4 (18 May 2022 09:31), Max: 101.1 (17 May 2022 16:00)  HR: 93 (18 May 2022 09:31) (68 - 110)  BP: 108/72 (18 May 2022 09:31) (77/40 - 119/73)  BP(mean): 83 (18 May 2022 05:00) (50 - 87)  RR: 16 (18 May 2022 09:31) (12 - 25)  SpO2: 100% (18 May 2022 09:31) (93% - 100%)    PHYSICAL EXAM:    GENERAL: NAD, well-groomed, well-developed  HEAD:  Atraumatic, Normocephalic  EYES: EOMI, conjunctiva and sclera clear  ENMT: Moist mucous membranes  NECK: No JVD  RESPIRATORY: Clear to auscultation bilaterally; No rales, rhonchi, wheezing, or rubs  CARDIOVASCULAR: Regular rate and rhythm; Nl s1, s2, no mrg  GASTROINTESTINAL: Soft, Nontender, Nondistended  BACK: no spinal or CVA tenderness  GENITOURINARY: Not examined  EXTREMITIES:  2+ Peripheral Pulses, No clubbing, cyanosis, or edema  NERVOUS SYSTEM: Moving all 4 extremities; No gross sensory deficits  SKIN: No rashes    LABS:                        10.7   13.31 )-----------( 121      ( 18 May 2022 01:27 )             31.8     05-18    135  |  106  |  11  ----------------------------<  123<H>  3.9   |  20<L>  |  0.74    Ca    8.2<L>      18 May 2022 01:27  Phos  1.6     05-18  Mg     2.00     05-18    CAPILLARY BLOOD GLUCOSE    RADIOLOGY & ADDITIONAL STUDIES:              [x] Care Discussed with Consultants/Other Providers: Urology PA - discussed

## 2022-05-18 NOTE — PROGRESS NOTE ADULT - SUBJECTIVE AND OBJECTIVE BOX
POD #2  Tmax this .9  111/77 85 100%RA    Pt has c/o feeling weak, SOB with walking  Abd- soft NT ND   Voiding 850  WBC 13  Urine proteus  Blood NTD

## 2022-05-18 NOTE — PROGRESS NOTE ADULT - ASSESSMENT
ASSESSMENT: 41yFemale p/w 5mm obstructing L ureteral stone with Tmax 104.8 now s/p L ureteral stent placement complicated by hypotension requiring pressors and SICU admission, doing well post op, weaned off pressors.   Transferred to floor 5/18  POD#2 - still spiking; voiding well; waiting for sensitivities  PLAN:  - cont zosyn  - check cultures  - CXR? will ask med consult

## 2022-05-19 ENCOUNTER — TRANSCRIPTION ENCOUNTER (OUTPATIENT)
Age: 42
End: 2022-05-19

## 2022-05-19 LAB
ANION GAP SERPL CALC-SCNC: 9 MMOL/L — SIGNIFICANT CHANGE UP (ref 7–14)
BUN SERPL-MCNC: 6 MG/DL — LOW (ref 7–23)
CALCIUM SERPL-MCNC: 8.2 MG/DL — LOW (ref 8.4–10.5)
CHLORIDE SERPL-SCNC: 106 MMOL/L — SIGNIFICANT CHANGE UP (ref 98–107)
CO2 SERPL-SCNC: 20 MMOL/L — LOW (ref 22–31)
CREAT SERPL-MCNC: 0.57 MG/DL — SIGNIFICANT CHANGE UP (ref 0.5–1.3)
EGFR: 118 ML/MIN/1.73M2 — SIGNIFICANT CHANGE UP
GLUCOSE SERPL-MCNC: 93 MG/DL — SIGNIFICANT CHANGE UP (ref 70–99)
HCT VFR BLD CALC: 30.2 % — LOW (ref 34.5–45)
HGB BLD-MCNC: 10.2 G/DL — LOW (ref 11.5–15.5)
MAGNESIUM SERPL-MCNC: 1.8 MG/DL — SIGNIFICANT CHANGE UP (ref 1.6–2.6)
MCHC RBC-ENTMCNC: 30.6 PG — SIGNIFICANT CHANGE UP (ref 27–34)
MCHC RBC-ENTMCNC: 33.8 GM/DL — SIGNIFICANT CHANGE UP (ref 32–36)
MCV RBC AUTO: 90.7 FL — SIGNIFICANT CHANGE UP (ref 80–100)
NRBC # BLD: 0 /100 WBCS — SIGNIFICANT CHANGE UP
NRBC # FLD: 0 K/UL — SIGNIFICANT CHANGE UP
PHOSPHATE SERPL-MCNC: 2.5 MG/DL — SIGNIFICANT CHANGE UP (ref 2.5–4.5)
PLATELET # BLD AUTO: 136 K/UL — LOW (ref 150–400)
POTASSIUM SERPL-MCNC: 3.8 MMOL/L — SIGNIFICANT CHANGE UP (ref 3.5–5.3)
POTASSIUM SERPL-SCNC: 3.8 MMOL/L — SIGNIFICANT CHANGE UP (ref 3.5–5.3)
RBC # BLD: 3.33 M/UL — LOW (ref 3.8–5.2)
RBC # FLD: 14.6 % — HIGH (ref 10.3–14.5)
SODIUM SERPL-SCNC: 135 MMOL/L — SIGNIFICANT CHANGE UP (ref 135–145)
WBC # BLD: 9.25 K/UL — SIGNIFICANT CHANGE UP (ref 3.8–10.5)
WBC # FLD AUTO: 9.25 K/UL — SIGNIFICANT CHANGE UP (ref 3.8–10.5)

## 2022-05-19 PROCEDURE — 99231 SBSQ HOSP IP/OBS SF/LOW 25: CPT

## 2022-05-19 PROCEDURE — 99233 SBSQ HOSP IP/OBS HIGH 50: CPT

## 2022-05-19 RX ORDER — OXYCODONE HYDROCHLORIDE 5 MG/1
5 TABLET ORAL EVERY 6 HOURS
Refills: 0 | Status: DISCONTINUED | OUTPATIENT
Start: 2022-05-19 | End: 2022-05-20

## 2022-05-19 RX ORDER — TAMSULOSIN HYDROCHLORIDE 0.4 MG/1
0.4 CAPSULE ORAL ONCE
Refills: 0 | Status: COMPLETED | OUTPATIENT
Start: 2022-05-19 | End: 2022-05-19

## 2022-05-19 RX ORDER — IBUPROFEN 200 MG
1 TABLET ORAL
Qty: 0 | Refills: 0 | DISCHARGE
Start: 2022-05-19

## 2022-05-19 RX ORDER — ACETAMINOPHEN 500 MG
1000 TABLET ORAL ONCE
Refills: 0 | Status: COMPLETED | OUTPATIENT
Start: 2022-05-19 | End: 2022-05-19

## 2022-05-19 RX ORDER — SENNA PLUS 8.6 MG/1
2 TABLET ORAL AT BEDTIME
Refills: 0 | Status: DISCONTINUED | OUTPATIENT
Start: 2022-05-19 | End: 2022-05-20

## 2022-05-19 RX ORDER — IBUPROFEN 200 MG
800 TABLET ORAL ONCE
Refills: 0 | Status: COMPLETED | OUTPATIENT
Start: 2022-05-19 | End: 2022-05-19

## 2022-05-19 RX ORDER — TAMSULOSIN HYDROCHLORIDE 0.4 MG/1
1 CAPSULE ORAL
Qty: 30 | Refills: 0
Start: 2022-05-19 | End: 2022-06-17

## 2022-05-19 RX ORDER — TAMSULOSIN HYDROCHLORIDE 0.4 MG/1
0.4 CAPSULE ORAL AT BEDTIME
Refills: 0 | Status: DISCONTINUED | OUTPATIENT
Start: 2022-05-19 | End: 2022-05-20

## 2022-05-19 RX ORDER — OXYCODONE HYDROCHLORIDE 5 MG/1
1 TABLET ORAL
Qty: 8 | Refills: 0
Start: 2022-05-19

## 2022-05-19 RX ORDER — ONDANSETRON 8 MG/1
4 TABLET, FILM COATED ORAL EVERY 6 HOURS
Refills: 0 | Status: DISCONTINUED | OUTPATIENT
Start: 2022-05-19 | End: 2022-05-20

## 2022-05-19 RX ADMIN — Medication 1000 MILLIGRAM(S): at 00:24

## 2022-05-19 RX ADMIN — OXYCODONE HYDROCHLORIDE 5 MILLIGRAM(S): 5 TABLET ORAL at 05:21

## 2022-05-19 RX ADMIN — Medication 800 MILLIGRAM(S): at 15:51

## 2022-05-19 RX ADMIN — Medication 1 TABLET(S): at 18:59

## 2022-05-19 RX ADMIN — Medication 400 MILLIGRAM(S): at 21:39

## 2022-05-19 RX ADMIN — OXYCODONE HYDROCHLORIDE 5 MILLIGRAM(S): 5 TABLET ORAL at 11:22

## 2022-05-19 RX ADMIN — PIPERACILLIN AND TAZOBACTAM 25 GRAM(S): 4; .5 INJECTION, POWDER, LYOPHILIZED, FOR SOLUTION INTRAVENOUS at 06:02

## 2022-05-19 RX ADMIN — ONDANSETRON 4 MILLIGRAM(S): 8 TABLET, FILM COATED ORAL at 05:22

## 2022-05-19 RX ADMIN — ENOXAPARIN SODIUM 40 MILLIGRAM(S): 100 INJECTION SUBCUTANEOUS at 18:14

## 2022-05-19 RX ADMIN — OXYCODONE HYDROCHLORIDE 5 MILLIGRAM(S): 5 TABLET ORAL at 11:55

## 2022-05-19 RX ADMIN — Medication 800 MILLIGRAM(S): at 16:20

## 2022-05-19 RX ADMIN — TAMSULOSIN HYDROCHLORIDE 0.4 MILLIGRAM(S): 0.4 CAPSULE ORAL at 14:01

## 2022-05-19 NOTE — PROGRESS NOTE ADULT - NS ATTEND AMEND GEN_ALL_CORE FT
She was seen and examined, she is voiding well but c/o headaches. Patient states that Motrin in the past helped. Her breathing has improved. Continue Zosyn.
She was seen and examined, prior to discharge she developed fever of 102. Patient states that she felt well and voiding well. However, given fever recommend monitoring one more day and renal US to assess for any other causes for fever since stent placement.

## 2022-05-19 NOTE — DISCHARGE NOTE PROVIDER - NSDCMRMEDTOKEN_GEN_ALL_CORE_FT
acetaminophen 500 mg oral tablet: 2 tablets every 6 hours as needed for mild to moderate pain, alternate with ibuprofen  albuterol 90 mcg/inh inhalation aerosol: 2 puff(s) inhaled every 4 hours, As needed, Bronchospasm  ibuprofen 200 mg oral tablet: 2 tablets every 6 hours as needed for mild to moderate pain, alternate with acetaminophen   acetaminophen 500 mg oral tablet: 2 tablets every 6 hours as needed for mild to moderate pain, alternate with ibuprofen  albuterol 90 mcg/inh inhalation aerosol: 2 puff(s) inhaled every 4 hours, As needed, Bronchospasm  ibuprofen 200 mg oral tablet: 2 tablets every 6 hours as needed for mild to moderate pain, alternate with acetaminophen  oxyCODONE 5 mg oral tablet: 1 tablet every 6 hours as needed for severe pain MDD:4  sulfamethoxazole-trimethoprim 800 mg-160 mg oral tablet: 1 tab(s) orally 2 times a day  tamsulosin 0.4 mg oral capsule: 1 cap(s) orally once a day    acetaminophen 500 mg oral tablet: 2 tablets every 6 hours as needed for mild to moderate pain, alternate with ibuprofen  albuterol 90 mcg/inh inhalation aerosol: 2 puff(s) inhaled every 4 hours, As needed, Bronchospasm  ibuprofen 200 mg oral tablet: 2 tablets every 6 hours as needed for mild to moderate pain, alternate with acetaminophen  oxyCODONE 5 mg oral tablet: 1 tablet every 6 hours as needed for severe pain MDD:4  senna oral tablet: 2 tab(s) orally once a day (at bedtime)  sulfamethoxazole-trimethoprim 800 mg-160 mg oral tablet: 1 tab(s) orally 2 times a day  tamsulosin 0.4 mg oral capsule: 1 cap(s) orally once a day

## 2022-05-19 NOTE — PROGRESS NOTE ADULT - ASSESSMENT
41y F admitted 6/16 with sepsis secondary to 5mm obstructing L ureteral stone with Tmax 104.8 underwent emergent  L ureteral stent placement complicated by hypotension requiring pressors and SICU admission, doing well post op, weaned off pressors and transferred to floor 5/18  POD#2 - still spiking; voiding well; waiting for sensitivities  POD #3 - remains afebrile, Ucx/ORcx proteus, Bcx NGTD    PLAN:  - change abx to augmentin  - f/u final blood cultures  - f/u medicine  - IVL  - DVT prophy, IS, OOB, ambulate   41y F admitted 6/16 with sepsis secondary to 5mm obstructing L ureteral stone with Tmax 104.8 underwent emergent  L ureteral stent placement complicated by hypotension requiring pressors and SICU admission, doing well post op, weaned off pressors and transferred to floor 5/18  POD#2 - still spiking; voiding well; waiting for sensitivities  POD #3 - remains afebrile, Ucx/ORcx proteus, Bcx NGTD    PLAN:  - change abx to bactrim  - f/u final blood cultures  - f/u medicine  - IVL  - DVT prophy, IS, OOB, ambulate

## 2022-05-19 NOTE — DISCHARGE NOTE PROVIDER - NSDCCPCAREPLAN_GEN_ALL_CORE_FT
PRINCIPAL DISCHARGE DIAGNOSIS  Diagnosis: Nephrolithiasis  Assessment and Plan of Treatment: You may have intermittent blood tinged urine and slight flank pain when you urinate.  This is normal and due to the stent in your ureter.   If your urine becomes bright red or with clots, please call the office.  You have a ureteral stent to help with tissue healing and drainage of the kidney. Take flomax daily for stent discomfort. The stent is temporary, and must be eventually removed or it may cause problems with infection and kidney damage if left in place greater than 3 months.  Continue antibiotics until finished.  Call Dr. Zelaya's office to schedule a follow up appointment for stent/stone removal  Call the office if you have fever greater than 101, difficulty urinating, pain not relieved with pain medication, nausea/vomiting.        SECONDARY DISCHARGE DIAGNOSES  Diagnosis: Asthma  Assessment and Plan of Treatment: Continue current home medications and follow up with your primary care provider

## 2022-05-19 NOTE — PROGRESS NOTE ADULT - SUBJECTIVE AND OBJECTIVE BOX
[facility] Division of [facility] Medicine  Tatiana Polk MD  Pager #[phone]    Patient is a 41y old  Female who presents with a chief complaint of Sepsis secondary to 5mm left obstruction ureteral calculus (19 May 2022 10:46)    SUBJECTIVE / OVERNIGHT EVENTS: Reports lower back discomfort around 4am and again about an hour earlier, received oxycodone a few minutes ago. Currently without nausea, vomiting, chest pain. Easily fatigued with exertion. No subjective fever or chills.    MEDICATIONS  (STANDING):  enoxaparin Injectable 40 milliGRAM(s) SubCutaneous every 24 hours  tamsulosin 0.4 milliGRAM(s) Oral once  trimethoprim  160 mG/sulfamethoxazole 800 mG 1 Tablet(s) Oral two times a day    MEDICATIONS  (PRN):  acetaminophen     Tablet .. 650 milliGRAM(s) Oral every 6 hours PRN Temp greater or equal to 38C (100.4F)  ALBUTerol    90 MICROgram(s) HFA Inhaler 2 Puff(s) Inhalation every 6 hours PRN SOB  ibuprofen  Tablet. 200 milliGRAM(s) Oral every 6 hours PRN Mild Pain (1 - 3), Moderate Pain (4 - 6), Severe Pain (7 - 10)  ondansetron Injectable 4 milliGRAM(s) IV Push every 6 hours PRN Nausea and/or Vomiting  oxyCODONE    IR 5 milliGRAM(s) Oral every 6 hours PRN Severe Pain (7 - 10)    CAPILLARY BLOOD GLUCOSE    I&O's Summary    18 May 2022 07:01  -  19 May 2022 07:00  --------------------------------------------------------  IN: 1050 mL / OUT: 550 mL / NET: 500 mL    19 May 2022 07:01  -  19 May 2022 12:17  --------------------------------------------------------  IN: 0 mL / OUT: 600 mL / NET: -600 mL    PHYSICAL EXAM:  Vital Signs Last 24 Hrs  T(C): 37 (19 May 2022 10:00), Max: 37.8 (18 May 2022 16:25)  T(F): 98.6 (19 May 2022 10:00), Max: 100.1 (18 May 2022 16:25)  HR: 80 (19 May 2022 10:00) (65 - 82)  BP: 123/85 (19 May 2022 10:00) (103/68 - 123/85)  BP(mean): --  RR: 18 (19 May 2022 10:00) (16 - 20)  SpO2: 100% (19 May 2022 10:00) (97% - 100%)    GENERAL: NAD, well-groomed, well-developed  HEAD:  Atraumatic, Normocephalic  EYES: conjunctiva and sclera clear  ENMT: Moist mucous membranes  NECK: No JVD  RESPIRATORY: Clear to auscultation bilaterally; No rales, rhonchi, wheezing  CARDIOVASCULAR: Regular rate and rhythm; normal s1, s2, no mrg  GASTROINTESTINAL: Soft, Nontender, Nondistended, +BS  BACK: nonreproducible L flank pain, no spinal tenderness  EXTREMITIES:  2+ Peripheral Pulses, No clubbing, cyanosis, or edema  NERVOUS SYSTEM: Moving all 4 extremities  SKIN: No rashes    LABS:                        10.2   9.25  )-----------( 136      ( 19 May 2022 07:40 )             30.2     05-19    135  |  106  |  6<L>  ----------------------------<  93  3.8   |  20<L>  |  0.57    Ca    8.2<L>      19 May 2022 07:40  Phos  2.5     05-19  Mg     1.80     05-19    Culture - Fungal, Body Fluid (collected 16 May 2022 17:56)  Source: .Body Fluid LEFT KIDNEY CULTURE  Preliminary Report (17 May 2022 08:56):    Testing in progress    Culture - Urine (collected 16 May 2022 17:56)  Source: Kidney LEFT KIDNEY CULTURE  Final Report (18 May 2022 16:25):    Numerous Proteus mirabilis  Organism: Proteus mirabilis (18 May 2022 16:25)  Organism: Proteus mirabilis (18 May 2022 16:25)    Culture - Blood (collected 16 May 2022 12:55)  Source: .Blood Blood-Peripheral  Preliminary Report (17 May 2022 15:01):    No growth to date.    Culture - Blood (collected 16 May 2022 12:45)  Source: .Blood Blood-Peripheral  Preliminary Report (17 May 2022 15:01):    No growth to date.    RADIOLOGY & ADDITIONAL TESTS: Reviewed    COORDINATION OF CARE:  Care Discussed with Consultants/Other Providers [Y- Urology PA]

## 2022-05-19 NOTE — DISCHARGE NOTE PROVIDER - CARE PROVIDER_API CALL
Humberto Zelaya)  Urology  [address], [place] [address]  Phone: [phone]  Fax: [phone]  Follow Up Time:

## 2022-05-19 NOTE — PROGRESS NOTE ADULT - SUBJECTIVE AND OBJECTIVE BOX
Overnight events:  None, remained afebrile    Subjective:  Pt had episode of stent colic earlier, no complaints now, voiding well, tolerating diet    Objective:    Vital signs  T(C): , Max: 38.3 (05-18-22 @ 07:05)  HR: 65 (05-19-22 @ 05:25)  BP: 107/79 (05-19-22 @ 05:25)  SpO2: 100% (05-19-22 @ 05:25)  Wt(kg): --    Output   Void: 150 + NS x 2  05-18 @ 07:01  -  05-19 @ 07:00  --------------------------------------------------------  IN: 1050 mL / OUT: 550 mL / NET: 500 mL        Gen: NAD  Abd: soft, nontender, no CVAT      Labs: pending                        10.7   13.31 )-----------( 121      ( 18 May 2022 01:27 )             31.8     18 May 2022 01:27    135    |  106    |  11     ----------------------------<  123    3.9     |  20     |  0.74     Ca    8.2        18 May 2022 01:27  Phos  1.6       18 May 2022 01:27  Mg     2.00      18 May 2022 01:27        OR Cx:   Culture - Urine (05.16.22 @ 17:56)    -  Trimethoprim/Sulfamethoxazole: S <=0.5/9.5    -  Ceftriaxone: S <=1 Enterobacter, Klebsiella aerogenes, Citrobacter, and Serratia may develop resistance during prolonged therapy    -  Ciprofloxacin: S <=0.25    -  Ertapenem: S <=0.5    -  Gentamicin: S <=2    -  Levofloxacin: S <=0.5    -  Meropenem: S <=1    -  Piperacillin/Tazobactam: S <=8    -  Tobramycin: S <=2    -  Amikacin: S <=16    -  Amoxicillin/Clavulanic Acid: S <=8/4    -  Ampicillin: S <=8 These ampicillin results predict results for amoxicillin    -  Ampicillin/Sulbactam: S <=4/2 Enterobacter, Klebsiella aerogenes, Citrobacter, and Serratia may develop resistance during prolonged therapy (3-4 days)    -  Aztreonam: S <=4    -  Cefazolin: S <=2 (MIC_CL_COM_ENTERIC_CEFAZU) For uncomplicated UTI with K. pneumoniae, E. coli, or P. mirablis: TONI <=16 is sensitive and TONI >=32 is resistant. This also predicts results for oral agents cefaclor, cefdinir, cefpodoxime, cefprozil, cefuroxime axetil, cephalexin and locarbef for uncomplicated UTI. Note that some isolates may be susceptible to these agents while testing resistant to cefazolin.    -  Cefepime: S <=2    -  Cefoxitin: S <=8    Specimen Source: Kidney LEFT KIDNEY CULTURE    Culture Results:   Numerous Proteus mirabilis    Organism Identification: Proteus mirabilis    Organism: Proteus mirabilis    Method Type: TONI      Blood Cx:   Culture - Blood (05.16.22 @ 12:55)    Specimen Source: .Blood Blood-Peripheral    Culture Results:   No growth to date.        Imaging

## 2022-05-19 NOTE — PROGRESS NOTE ADULT - ASSESSMENT
41 year old female with asthma, endometriosis, kidney stones, presented with lower back pain found to have obstructive uropathy with mild left hydro with 5mm stone at [facility] now s/p cystoscopy with retrograde pyelogram and left ureteral stent placement by Urology on 5/18/22, hospitalization complicated by fever to 104 and hypotension requiring SICU and pressors.

## 2022-05-19 NOTE — DISCHARGE NOTE PROVIDER - NSDCFUSCHEDAPPT_GEN_ALL_CORE_FT
Humberto Zelaya  [facility] Physician [facility]  UROLOGY [address]  Scheduled Appointment: 06/09/2022

## 2022-05-19 NOTE — DISCHARGE NOTE PROVIDER - HOSPITAL COURSE
41y F admitted 6/16 with sepsis secondary to 5mm obstructing L ureteral stone with Tmax 104.8 underwent emergent  L ureteral stent placement complicated by hypotension requiring pressors and SICU admission, doing well post op, weaned off pressors and transferred to floor 5/18  POD#2 - still spiking; voiding well; waiting for sensitivities  POD #3 - remains afebrile, Ucx/ORcx proteus, Bcx NGTD, abx changed to bactrim and pt d/c to f/u with Dr. Zelaya 41y F admitted 6/16 with sepsis secondary to 5mm obstructing L ureteral stone with Tmax 104.8 underwent emergent  L ureteral stent placement complicated by hypotension requiring pressors and SICU admission, doing well post op, weaned off pressors and transferred to floor 5/18  POD#2 - still spiking; voiding well; waiting for sensitivities  POD #3 - remained afebrile overnight, Ucx/ORcx proteus, Bcx NGTD, abx changed to bactrim, pt spiked temp to 102 at 5pm with stable BP, renal U/S ordered.  POD #4 - remained afebrile and pain free, U/S normal, voiding well WBC normal, pt d/c on bactrim to f/u with Dr. Zelaya

## 2022-05-20 ENCOUNTER — TRANSCRIPTION ENCOUNTER (OUTPATIENT)
Age: 42
End: 2022-05-20

## 2022-05-20 VITALS
TEMPERATURE: 99 F | HEART RATE: 74 BPM | OXYGEN SATURATION: 100 % | RESPIRATION RATE: 17 BRPM | SYSTOLIC BLOOD PRESSURE: 104 MMHG | DIASTOLIC BLOOD PRESSURE: 78 MMHG

## 2022-05-20 LAB
ANION GAP SERPL CALC-SCNC: 12 MMOL/L — SIGNIFICANT CHANGE UP (ref 7–14)
BUN SERPL-MCNC: 5 MG/DL — LOW (ref 7–23)
CALCIUM SERPL-MCNC: 8.8 MG/DL — SIGNIFICANT CHANGE UP (ref 8.4–10.5)
CHLORIDE SERPL-SCNC: 102 MMOL/L — SIGNIFICANT CHANGE UP (ref 98–107)
CO2 SERPL-SCNC: 19 MMOL/L — LOW (ref 22–31)
CREAT SERPL-MCNC: 0.61 MG/DL — SIGNIFICANT CHANGE UP (ref 0.5–1.3)
EGFR: 116 ML/MIN/1.73M2 — SIGNIFICANT CHANGE UP
GLUCOSE SERPL-MCNC: 76 MG/DL — SIGNIFICANT CHANGE UP (ref 70–99)
HCT VFR BLD CALC: 33.9 % — LOW (ref 34.5–45)
HGB BLD-MCNC: 11.4 G/DL — LOW (ref 11.5–15.5)
MAGNESIUM SERPL-MCNC: 1.8 MG/DL — SIGNIFICANT CHANGE UP (ref 1.6–2.6)
MCHC RBC-ENTMCNC: 30 PG — SIGNIFICANT CHANGE UP (ref 27–34)
MCHC RBC-ENTMCNC: 33.6 GM/DL — SIGNIFICANT CHANGE UP (ref 32–36)
MCV RBC AUTO: 89.2 FL — SIGNIFICANT CHANGE UP (ref 80–100)
NRBC # BLD: 0 /100 WBCS — SIGNIFICANT CHANGE UP
NRBC # FLD: 0 K/UL — SIGNIFICANT CHANGE UP
PHOSPHATE SERPL-MCNC: 3.1 MG/DL — SIGNIFICANT CHANGE UP (ref 2.5–4.5)
PLATELET # BLD AUTO: 154 K/UL — SIGNIFICANT CHANGE UP (ref 150–400)
POTASSIUM SERPL-MCNC: 3.6 MMOL/L — SIGNIFICANT CHANGE UP (ref 3.5–5.3)
POTASSIUM SERPL-SCNC: 3.6 MMOL/L — SIGNIFICANT CHANGE UP (ref 3.5–5.3)
RBC # BLD: 3.8 M/UL — SIGNIFICANT CHANGE UP (ref 3.8–5.2)
RBC # FLD: 14.7 % — HIGH (ref 10.3–14.5)
SODIUM SERPL-SCNC: 133 MMOL/L — LOW (ref 135–145)
WBC # BLD: 9.7 K/UL — SIGNIFICANT CHANGE UP (ref 3.8–10.5)
WBC # FLD AUTO: 9.7 K/UL — SIGNIFICANT CHANGE UP (ref 3.8–10.5)

## 2022-05-20 PROCEDURE — 76770 US EXAM ABDO BACK WALL COMP: CPT | Mod: 26

## 2022-05-20 PROCEDURE — 99233 SBSQ HOSP IP/OBS HIGH 50: CPT

## 2022-05-20 RX ORDER — SENNA PLUS 8.6 MG/1
2 TABLET ORAL
Qty: 0 | Refills: 0 | DISCHARGE
Start: 2022-05-20

## 2022-05-20 RX ORDER — ACETAMINOPHEN 500 MG
975 TABLET ORAL ONCE
Refills: 0 | Status: COMPLETED | OUTPATIENT
Start: 2022-05-20 | End: 2022-05-20

## 2022-05-20 RX ADMIN — Medication 200 MILLIGRAM(S): at 03:43

## 2022-05-20 RX ADMIN — Medication 200 MILLIGRAM(S): at 02:43

## 2022-05-20 RX ADMIN — Medication 1 TABLET(S): at 05:54

## 2022-05-20 RX ADMIN — Medication 975 MILLIGRAM(S): at 11:27

## 2022-05-20 RX ADMIN — Medication 1000 MILLIGRAM(S): at 14:43

## 2022-05-20 RX ADMIN — Medication 975 MILLIGRAM(S): at 12:15

## 2022-05-20 NOTE — DISCHARGE NOTE NURSING/CASE MANAGEMENT/SOCIAL WORK - NSDCPEFALRISK_GEN_ALL_CORE
For information on Fall & Injury Prevention, visit: [email or web address] OR  [email or web address] OR  https://www.cdc.gov/steadi/patient.html

## 2022-05-20 NOTE — PROGRESS NOTE ADULT - SUBJECTIVE AND OBJECTIVE BOX
Overnight events:  Pt spiked temp to 102 at 5pm yesterday with stable BP    Subjective:  Pt offers no complaints fee    Objective:    Vital signs  T(C): , Max: 38.9 (05-19-22 @ 17:00)  HR: 71 (05-20-22 @ 05:56)  BP: 113/74 (05-20-22 @ 05:56)  SpO2: 100% (05-20-22 @ 05:56)  Wt(kg): --    Output     05-19 @ 07:01  -  05-20 @ 07:00  --------------------------------------------------------  IN: 0 mL / OUT: 1550 mL / NET: -1550 mL        Gen  Abd      Labs                        10.2   9.25  )-----------( 136      ( 19 May 2022 07:40 )             30.2     19 May 2022 07:40    135    |  106    |  6      ----------------------------<  93     3.8     |  20     |  0.57     Ca    8.2        19 May 2022 07:40  Phos  2.5       19 May 2022 07:40  Mg     1.80      19 May 2022 07:40        Urine Cx:   Blood Cx:     Imaging Overnight events:  Pt spiked temp to 102 at 5pm yesterday with stable BP, remained afebrile since    Subjective:  Pt offers no complaints feels well, states she didn't feel febrile yesterday, no chills or diaphoresis    Objective:    Vital signs  T(C): , Max: 38.9 (05-19-22 @ 17:00)  HR: 71 (05-20-22 @ 05:56)  BP: 113/74 (05-20-22 @ 05:56)  SpO2: 100% (05-20-22 @ 05:56)  Wt(kg): --    Output   Void: 1200  05-19 @ 07:01  -  05-20 @ 07:00  --------------------------------------------------------  IN: 0 mL / OUT: 1550 mL / NET: -1550 mL        Gen: NAD  Abd: soft, nontender, no CVAT      Labs: pending today                        10.2   9.25  )-----------( 136      ( 19 May 2022 07:40 )             30.2     19 May 2022 07:40    135    |  106    |  6      ----------------------------<  93     3.8     |  20     |  0.57     Ca    8.2        19 May 2022 07:40  Phos  2.5       19 May 2022 07:40  Mg     1.80      19 May 2022 07:40

## 2022-05-20 NOTE — PROGRESS NOTE ADULT - PROBLEM SELECTOR PLAN 2
infection in urine. initially on zosyn now transitioned to bactrim as above
infection in urine. initially on zosyn now transitioned to bactrim as above

## 2022-05-20 NOTE — DISCHARGE NOTE NURSING/CASE MANAGEMENT/SOCIAL WORK - NSDCPNINST_GEN_ALL_CORE
Notify Dr Bonilla if you experience any increase in pain not relieved with medication, any persistent nausea vomiting, any bright red blood or clots in urine or any fever >100.5.  Drink plenty of fluids.  No heavy lifting or straining. Complete your course of antibiotics as prescribed.    Notify Dr Zelaya if you experience any increase in pain not relieved with medication, any persistent nausea vomiting, any bright red blood or clots in urine or any fever >100.5.  Drink plenty of fluids.  No heavy lifting or straining. Complete your course of antibiotics as prescribed.

## 2022-05-20 NOTE — PROGRESS NOTE ADULT - SUBJECTIVE AND OBJECTIVE BOX
Patient is a 41y old  Female who presents with a chief complaint of lower back pain, kidney stone (19 May 2022 12:12)    SUBJECTIVE / OVERNIGHT EVENTS: T 102 yesterday 5pm. Patient denies any fevers or chills at that time, felt did not have true fever. Afebrile since. Pain improving. No nausea, vomiting, chest pain, shortness of breath, abdominal johny, rash, edema, bleeding.     MEDICATIONS  (STANDING):  enoxaparin Injectable 40 milliGRAM(s) SubCutaneous every 24 hours  senna 2 Tablet(s) Oral at bedtime  tamsulosin 0.4 milliGRAM(s) Oral at bedtime  trimethoprim  160 mG/sulfamethoxazole 800 mG 1 Tablet(s) Oral two times a day    MEDICATIONS  (PRN):  acetaminophen     Tablet .. 650 milliGRAM(s) Oral every 6 hours PRN Temp greater or equal to 38C (100.4F)  ALBUTerol    90 MICROgram(s) HFA Inhaler 2 Puff(s) Inhalation every 6 hours PRN SOB  ibuprofen  Tablet. 200 milliGRAM(s) Oral every 6 hours PRN Mild Pain (1 - 3), Moderate Pain (4 - 6), Severe Pain (7 - 10)  ondansetron Injectable 4 milliGRAM(s) IV Push every 6 hours PRN Nausea and/or Vomiting  oxyCODONE    IR 5 milliGRAM(s) Oral every 6 hours PRN Severe Pain (7 - 10)    CAPILLARY BLOOD GLUCOSE    I&O's Summary    19 May 2022 07:01  -  20 May 2022 07:00  --------------------------------------------------------  IN: 0 mL / OUT: 1550 mL / NET: -1550 mL    20 May 2022 07:01  -  20 May 2022 13:31  --------------------------------------------------------  IN: 0 mL / OUT: 500 mL / NET: -500 mL    PHYSICAL EXAM:  Vital Signs Last 24 Hrs  T(C): 37.1 (20 May 2022 10:00), Max: 38.9 (19 May 2022 17:00)  T(F): 98.7 (20 May 2022 10:00), Max: 102 (19 May 2022 17:00)  HR: 74 (20 May 2022 10:00) (71 - 98)  BP: 104/78 (20 May 2022 10:00) (92/62 - 122/80)  BP(mean): --  RR: 17 (20 May 2022 10:00) (16 - 18)  SpO2: 100% (20 May 2022 10:00) (97% - 100%)    GENERAL: NAD, well-groomed, well-developed  HEAD:  Atraumatic, Normocephalic  EYES: conjunctiva and sclera clear  ENMT: Moist mucous membranes  NECK: No JVD  RESPIRATORY: Clear to auscultation bilaterally; No rales, rhonchi, wheezing  CARDIOVASCULAR: Regular rate and rhythm; normal s1, s2, no mrg  GASTROINTESTINAL: Soft, Nontender, Nondistended, +BS  BACK: no CVA tenderness  EXTREMITIES:  2+ Peripheral Pulses, No clubbing, cyanosis, or edema  NERVOUS SYSTEM: Moving all 4 extremities  SKIN: No rashes    LABS:                        11.4   9.70  )-----------( 154      ( 20 May 2022 09:12 )             33.9     05-20    133<L>  |  102  |  5<L>  ----------------------------<  76  3.6   |  19<L>  |  0.61    Ca    8.8      20 May 2022 09:12  Phos  3.1     05-20  Mg     1.80     05-20    RADIOLOGY & ADDITIONAL TESTS: Reviewed  5/20/22 US kidney/bladder  No hydronephrosis.  Left ureteral stent in place.    COORDINATION OF CARE:  Care Discussed with Consultants/Other Providers [Y- Urology PA]

## 2022-05-20 NOTE — DISCHARGE NOTE NURSING/CASE MANAGEMENT/SOCIAL WORK - NSTRANSFERBELONGINGSRESP_GEN_A_NUR
yes (1) Drift; limb holds 90 (or 45) degrees, but drifts down before full 10 secs; does not hit bed or other support

## 2022-05-20 NOTE — DISCHARGE NOTE NURSING/CASE MANAGEMENT/SOCIAL WORK - PATIENT PORTAL LINK FT
You can access the FollowMyHealth Patient Portal offered by [facility] by registering at the following website: [email or web address]. By joining [employer]’s FollowMyHealth portal, you will also be able to view your health information using other applications (apps) compatible with our system.

## 2022-05-20 NOTE — PROGRESS NOTE ADULT - ASSESSMENT
41y F admitted 6/16 with sepsis secondary to 5mm obstructing L ureteral stone with Tmax 104.8 underwent emergent  L ureteral stent placement complicated by hypotension requiring pressors and SICU admission, doing well post op, weaned off pressors and transferred to floor 5/18  POD#2 - still spiking; voiding well; waiting for sensitivities  POD #3 - remains afebrile, Ucx/ORcx proteus, Bcx NGTD, abx changed to bactrim, pt spiked temp to 102 at 5pm but did not feel febrile or have chills/diaphoresis, renal U/S ordered  POD#4 - remained afebrile, feels well, wants to go home, awaiting renal U/S    PLAN:  - ccntinue  bactrim  - f/u final blood cultures  - f/u renal U/S  - f/u medicine  - IVL  - DVT prophy, IS, OOB, ambulate

## 2022-05-20 NOTE — PROGRESS NOTE ADULT - PROBLEM SELECTOR PLAN 1
5mm stone at [facility] now s/p cystoscopy with retrograde pyelogram and left ureteral stent placement by Urology on 5/18/22  - Urine culture with proteus, blood cultures NGTD, transitioned to bactrim  - Febrile yesterday to 102 - patient didn't have fever,chills and felt possible inaccurate. Afebrile since. WBC normalized. US kidney/bladder without acute findings  - renal function intact  - Continued management per urology
5mm stone at [facility] now s/p cystoscopy with retrograde pyelogram and left ureteral stent placement by Urology on 5/18/22  - Urine culture with proteus, blood cultures NGTD, transitioned to bactrim  - Fever curve improving  - renal function intact  - leukocytosis normalized  - c/w pain control, per Urology likely stent colic experienced earlier today  - Continued management per urology.

## 2022-05-21 LAB
CULTURE RESULTS: SIGNIFICANT CHANGE UP
CULTURE RESULTS: SIGNIFICANT CHANGE UP
SPECIMEN SOURCE: SIGNIFICANT CHANGE UP
SPECIMEN SOURCE: SIGNIFICANT CHANGE UP

## 2022-05-31 ENCOUNTER — NON-APPOINTMENT (OUTPATIENT)
Age: 42
End: 2022-05-31

## 2022-05-31 RX ORDER — PHENAZOPYRIDINE 200 MG/1
200 TABLET, FILM COATED ORAL 3 TIMES DAILY
Qty: 21 | Refills: 0 | Status: ACTIVE | COMMUNITY
Start: 2022-05-31 | End: 1900-01-01

## 2022-05-31 RX ORDER — KETOROLAC TROMETHAMINE 10 MG/1
10 TABLET, FILM COATED ORAL EVERY 6 HOURS
Qty: 20 | Refills: 1 | Status: ACTIVE | COMMUNITY
Start: 2022-05-31 | End: 1900-01-01

## 2022-06-01 ENCOUNTER — NON-APPOINTMENT (OUTPATIENT)
Age: 42
End: 2022-06-01

## 2022-06-02 LAB
CULTURE RESULTS: SIGNIFICANT CHANGE UP
SPECIMEN SOURCE: SIGNIFICANT CHANGE UP

## 2022-06-09 ENCOUNTER — APPOINTMENT (OUTPATIENT)
Dept: UROLOGY | Facility: CLINIC | Age: 42
End: 2022-06-09

## 2022-06-10 ENCOUNTER — APPOINTMENT (OUTPATIENT)
Dept: UROLOGY | Facility: CLINIC | Age: 42
End: 2022-06-10
Payer: COMMERCIAL

## 2022-06-10 DIAGNOSIS — N20.1 CALCULUS OF URETER: ICD-10-CM

## 2022-06-10 PROCEDURE — 99214 OFFICE O/P EST MOD 30 MIN: CPT

## 2022-06-10 NOTE — REVIEW OF SYSTEMS
[Date of last menstrual period ____] : date of last menstrual period: [unfilled] [Seen by urologist before (Name)  ___] : Previously seen by a urologist: [unfilled] [History of kidney stones] : history of kidney stones [Itching] : itching [Negative] : Heme/Lymph

## 2022-06-10 NOTE — ASSESSMENT
[FreeTextEntry1] : Left kidney stent in place\par \par Will need left Ureteroscopy laser lithotripsy stent insertion/change\par Risks, benefits and alternatives discussed.\par Risk of infection, multiple procedures, ureteral injury, ureteral stricture, incomplete stones clearance, sepsis, bleeding, retention, all discussed.\par Will schedule.\par \par \par Referred patient to Dr. Ruby for kidney stones risk workup and prevention\par

## 2022-06-10 NOTE — PHYSICAL EXAM
[General Appearance - In No Acute Distress] : no acute distress [Edema] : no peripheral edema [] : no respiratory distress [Normal Station and Gait] : the gait and station were normal for the patient's age [Skin Color & Pigmentation] : normal skin color and pigmentation [Oriented To Time, Place, And Person] : oriented to person, place, and time

## 2022-06-10 NOTE — HISTORY OF PRESENT ILLNESS
[None] : None [FreeTextEntry1] : 42 year old female presents to follow up with kidney stones. She was admitted 5/16/22 with sepsis secondary to 5mm obstructing L ureteral stone. Pt had left kidney stent placed by Dr. Zelaya, complicated by\par hypotension requiring pressors and SICU admission. \par \par Hospital records reviewed\par Labs reviewed\par CT images reviewed

## 2022-06-14 LAB — BACTERIA UR CULT: NORMAL

## 2022-06-15 ENCOUNTER — OUTPATIENT (OUTPATIENT)
Dept: OUTPATIENT SERVICES | Facility: HOSPITAL | Age: 42
LOS: 1 days | End: 2022-06-15
Payer: COMMERCIAL

## 2022-06-15 VITALS
HEIGHT: 61 IN | DIASTOLIC BLOOD PRESSURE: 64 MMHG | HEART RATE: 93 BPM | SYSTOLIC BLOOD PRESSURE: 92 MMHG | WEIGHT: 149.03 LBS | OXYGEN SATURATION: 100 % | RESPIRATION RATE: 18 BRPM | TEMPERATURE: 98 F

## 2022-06-15 DIAGNOSIS — Z98.89 OTHER SPECIFIED POSTPROCEDURAL STATES: Chronic | ICD-10-CM

## 2022-06-15 DIAGNOSIS — Z11.52 ENCOUNTER FOR SCREENING FOR COVID-19: ICD-10-CM

## 2022-06-15 DIAGNOSIS — N20.0 CALCULUS OF KIDNEY: ICD-10-CM

## 2022-06-15 DIAGNOSIS — Z01.818 ENCOUNTER FOR OTHER PREPROCEDURAL EXAMINATION: ICD-10-CM

## 2022-06-15 LAB
ANION GAP SERPL CALC-SCNC: 9 MMOL/L — SIGNIFICANT CHANGE UP (ref 5–17)
BUN SERPL-MCNC: 12 MG/DL — SIGNIFICANT CHANGE UP (ref 7–23)
CALCIUM SERPL-MCNC: 9 MG/DL — SIGNIFICANT CHANGE UP (ref 8.4–10.5)
CHLORIDE SERPL-SCNC: 106 MMOL/L — SIGNIFICANT CHANGE UP (ref 96–108)
CO2 SERPL-SCNC: 22 MMOL/L — SIGNIFICANT CHANGE UP (ref 22–31)
CREAT SERPL-MCNC: 0.77 MG/DL — SIGNIFICANT CHANGE UP (ref 0.5–1.3)
EGFR: 99 ML/MIN/1.73M2 — SIGNIFICANT CHANGE UP
GLUCOSE SERPL-MCNC: 103 MG/DL — HIGH (ref 70–99)
HCT VFR BLD CALC: 33.1 % — LOW (ref 34.5–45)
HGB BLD-MCNC: 10.7 G/DL — LOW (ref 11.5–15.5)
MCHC RBC-ENTMCNC: 29.9 PG — SIGNIFICANT CHANGE UP (ref 27–34)
MCHC RBC-ENTMCNC: 32.3 GM/DL — SIGNIFICANT CHANGE UP (ref 32–36)
MCV RBC AUTO: 92.5 FL — SIGNIFICANT CHANGE UP (ref 80–100)
NRBC # BLD: 0 /100 WBCS — SIGNIFICANT CHANGE UP (ref 0–0)
PLATELET # BLD AUTO: 253 K/UL — SIGNIFICANT CHANGE UP (ref 150–400)
POTASSIUM SERPL-MCNC: 4.4 MMOL/L — SIGNIFICANT CHANGE UP (ref 3.5–5.3)
POTASSIUM SERPL-SCNC: 4.4 MMOL/L — SIGNIFICANT CHANGE UP (ref 3.5–5.3)
RBC # BLD: 3.58 M/UL — LOW (ref 3.8–5.2)
RBC # FLD: 14.1 % — SIGNIFICANT CHANGE UP (ref 10.3–14.5)
SARS-COV-2 RNA SPEC QL NAA+PROBE: SIGNIFICANT CHANGE UP
SODIUM SERPL-SCNC: 137 MMOL/L — SIGNIFICANT CHANGE UP (ref 135–145)
WBC # BLD: 6.34 K/UL — SIGNIFICANT CHANGE UP (ref 3.8–10.5)
WBC # FLD AUTO: 6.34 K/UL — SIGNIFICANT CHANGE UP (ref 3.8–10.5)

## 2022-06-15 PROCEDURE — U0003: CPT

## 2022-06-15 PROCEDURE — 80048 BASIC METABOLIC PNL TOTAL CA: CPT

## 2022-06-15 PROCEDURE — 85027 COMPLETE CBC AUTOMATED: CPT

## 2022-06-15 PROCEDURE — 87086 URINE CULTURE/COLONY COUNT: CPT

## 2022-06-15 PROCEDURE — G0463: CPT

## 2022-06-15 PROCEDURE — U0005: CPT

## 2022-06-15 PROCEDURE — C9803: CPT

## 2022-06-15 NOTE — H&P PST ADULT - HISTORY OF PRESENT ILLNESS
This is a 43 y/o female PMH well controlled asthma, no recent steroid use, renal calculi, presented to [facility] 5/16 with low back pain, S/P left ureteral stent placement 5/16, c/b fever of 104 and hypotension requiring pressors in the SICU. The patient was treated with IV Zosyn and then transitioned to po Bactrim, which was completed a few weeks ago, she is feeling better; however, still having some discomfort from the stent and continues to have BP averaging, 80's-60's.  Presents today for cystoscopy, left ureteroscopy, stent placement.    No H/O COVID infection,  COVID swab performed today at [facility]. This is a 41 y/o female PMH well controlled asthma, no recent steroid use, renal calculi, presented to [facility] 5/16 with low back pain, S/P left ureteral stent placement 5/16, c/b fever of 104 and hypotension requiring pressors in the SICU. The patient was treated with IV Zosyn and then transitioned to po Bactrim, which was completed a few weeks ago, she is feeling better; however, still having some discomfort from the stent and continues to have BP averaging, 80's-60's, as per patient her baseline is low 100's systolic.  Presents today for cystoscopy, left ureteroscopy, stent placement.    No H/O COVID infection,  COVID swab performed today at [facility] beds at the [facility].

## 2022-06-16 ENCOUNTER — TRANSCRIPTION ENCOUNTER (OUTPATIENT)
Age: 42
End: 2022-06-16

## 2022-06-16 LAB
CULTURE RESULTS: SIGNIFICANT CHANGE UP
SPECIMEN SOURCE: SIGNIFICANT CHANGE UP

## 2022-06-16 RX ORDER — SODIUM CHLORIDE 9 MG/ML
1000 INJECTION, SOLUTION INTRAVENOUS
Refills: 0 | Status: DISCONTINUED | OUTPATIENT
Start: 2022-06-17 | End: 2022-07-01

## 2022-06-16 RX ORDER — SODIUM CHLORIDE 9 MG/ML
3 INJECTION INTRAMUSCULAR; INTRAVENOUS; SUBCUTANEOUS EVERY 8 HOURS
Refills: 0 | Status: DISCONTINUED | OUTPATIENT
Start: 2022-06-17 | End: 2022-07-01

## 2022-06-17 ENCOUNTER — OUTPATIENT (OUTPATIENT)
Dept: OUTPATIENT SERVICES | Facility: HOSPITAL | Age: 42
LOS: 1 days | End: 2022-06-17
Payer: COMMERCIAL

## 2022-06-17 ENCOUNTER — TRANSCRIPTION ENCOUNTER (OUTPATIENT)
Age: 42
End: 2022-06-17

## 2022-06-17 ENCOUNTER — RESULT REVIEW (OUTPATIENT)
Age: 42
End: 2022-06-17

## 2022-06-17 ENCOUNTER — APPOINTMENT (OUTPATIENT)
Dept: UROLOGY | Facility: HOSPITAL | Age: 42
End: 2022-06-17

## 2022-06-17 VITALS
TEMPERATURE: 98 F | HEART RATE: 93 BPM | HEIGHT: 61 IN | WEIGHT: 149.03 LBS | RESPIRATION RATE: 18 BRPM | OXYGEN SATURATION: 98 % | DIASTOLIC BLOOD PRESSURE: 68 MMHG | SYSTOLIC BLOOD PRESSURE: 101 MMHG

## 2022-06-17 VITALS
HEART RATE: 75 BPM | RESPIRATION RATE: 20 BRPM | DIASTOLIC BLOOD PRESSURE: 71 MMHG | OXYGEN SATURATION: 100 % | SYSTOLIC BLOOD PRESSURE: 108 MMHG

## 2022-06-17 DIAGNOSIS — N20.0 CALCULUS OF KIDNEY: ICD-10-CM

## 2022-06-17 DIAGNOSIS — Z98.89 OTHER SPECIFIED POSTPROCEDURAL STATES: Chronic | ICD-10-CM

## 2022-06-17 DIAGNOSIS — Z01.818 ENCOUNTER FOR OTHER PREPROCEDURAL EXAMINATION: ICD-10-CM

## 2022-06-17 PROCEDURE — 76000 FLUOROSCOPY <1 HR PHYS/QHP: CPT

## 2022-06-17 PROCEDURE — 88300 SURGICAL PATH GROSS: CPT | Mod: 26

## 2022-06-17 PROCEDURE — C1889: CPT

## 2022-06-17 PROCEDURE — 52352 CYSTOURETERO W/STONE REMOVE: CPT | Mod: LT

## 2022-06-17 PROCEDURE — C1758: CPT

## 2022-06-17 PROCEDURE — 88300 SURGICAL PATH GROSS: CPT

## 2022-06-17 PROCEDURE — 82365 CALCULUS SPECTROSCOPY: CPT

## 2022-06-17 PROCEDURE — 74420 UROGRAPHY RTRGR +-KUB: CPT | Mod: 26

## 2022-06-17 PROCEDURE — C1769: CPT

## 2022-06-17 DEVICE — URETERAL CATH OPEN END 5FR 70CM: Type: IMPLANTABLE DEVICE | Status: FUNCTIONAL

## 2022-06-17 DEVICE — GUIDEWIRE SENSOR DUAL-FLEX NITINOL STRAIGHT .035" X 150CM: Type: IMPLANTABLE DEVICE | Status: FUNCTIONAL

## 2022-06-17 DEVICE — STONE BASKET ZEROTIP NITINOL 4-WIRE 1.9FR 120CM X 12MM: Type: IMPLANTABLE DEVICE | Status: FUNCTIONAL

## 2022-06-17 RX ORDER — KETOROLAC TROMETHAMINE 30 MG/ML
1 SYRINGE (ML) INJECTION
Qty: 0 | Refills: 0 | DISCHARGE

## 2022-06-17 RX ORDER — KETOROLAC TROMETHAMINE 30 MG/ML
1 SYRINGE (ML) INJECTION
Qty: 12 | Refills: 0
Start: 2022-06-17 | End: 2022-06-19

## 2022-06-17 RX ORDER — LIDOCAINE HCL 20 MG/ML
0.2 VIAL (ML) INJECTION ONCE
Refills: 0 | Status: COMPLETED | OUTPATIENT
Start: 2022-06-17 | End: 2022-06-17

## 2022-06-17 RX ORDER — CEFAZOLIN SODIUM 1 G
2000 VIAL (EA) INJECTION ONCE
Refills: 0 | Status: COMPLETED | OUTPATIENT
Start: 2022-06-17 | End: 2022-06-17

## 2022-06-17 RX ORDER — FENTANYL CITRATE 50 UG/ML
25 INJECTION INTRAVENOUS
Refills: 0 | Status: DISCONTINUED | OUTPATIENT
Start: 2022-06-17 | End: 2022-06-17

## 2022-06-17 RX ORDER — PHENAZOPYRIDINE HCL 100 MG
2 TABLET ORAL
Qty: 0 | Refills: 0 | DISCHARGE

## 2022-06-17 RX ORDER — CIPROFLOXACIN LACTATE 400MG/40ML
1 VIAL (ML) INTRAVENOUS
Qty: 6 | Refills: 0
Start: 2022-06-17 | End: 2022-06-19

## 2022-06-17 RX ORDER — ONDANSETRON 8 MG/1
4 TABLET, FILM COATED ORAL ONCE
Refills: 0 | Status: DISCONTINUED | OUTPATIENT
Start: 2022-06-17 | End: 2022-07-01

## 2022-06-17 RX ADMIN — SODIUM CHLORIDE 100 MILLILITER(S): 9 INJECTION, SOLUTION INTRAVENOUS at 06:18

## 2022-06-17 RX ADMIN — SODIUM CHLORIDE 3 MILLILITER(S): 9 INJECTION INTRAMUSCULAR; INTRAVENOUS; SUBCUTANEOUS at 06:00

## 2022-06-17 NOTE — ASU DISCHARGE PLAN (ADULT/PEDIATRIC) - NURSING INSTRUCTIONS
Tylenol/acetaminophen  as needed for pain/discomfort.  NEXT DOSE:  Tylenol  OK @  1:30pm this afternoon, if needed.  Follow up with MD as requested; call office for appointment.

## 2022-06-17 NOTE — ASU DISCHARGE PLAN (ADULT/PEDIATRIC) - CARE PROVIDER_API CALL
Nahid Dorman)  Urology  [address] [place] [address]  Phone: [phone]  Fax: [phone]  Follow Up Time:

## 2022-06-17 NOTE — ASU DISCHARGE PLAN (ADULT/PEDIATRIC) - ASU DC SPECIAL INSTRUCTIONSFT
You may take up to 650mg of tylenol every 6 hours for pain.  You alternate this with toradol 10mg.    Call the office if you have fever greater than 101, difficulty urinating, pain not relieved with pain medication, nausea/vomiting.    We have sent antibiotics to your pharmacy - please take as prescribed.    Please call Dr. Dorman's office to follow-up next Friday.

## 2022-06-17 NOTE — ASU DISCHARGE PLAN (ADULT/PEDIATRIC) - NS MD DC FALL RISK RISK
For information on Fall & Injury Prevention, visit: [email or web address] OR  [email or web address] OR  https://www.cdc.gov/steadi/patient.html

## 2022-06-24 ENCOUNTER — APPOINTMENT (OUTPATIENT)
Dept: UROLOGY | Facility: CLINIC | Age: 42
End: 2022-06-24
Payer: COMMERCIAL

## 2022-06-24 VITALS
TEMPERATURE: 97.8 F | BODY MASS INDEX: 27.94 KG/M2 | RESPIRATION RATE: 15 BRPM | WEIGHT: 148 LBS | DIASTOLIC BLOOD PRESSURE: 67 MMHG | SYSTOLIC BLOOD PRESSURE: 91 MMHG | HEART RATE: 9 BPM | OXYGEN SATURATION: 99 % | HEIGHT: 61 IN

## 2022-06-24 LAB
NIDUS STONE QN: SIGNIFICANT CHANGE UP
SURGICAL PATHOLOGY STUDY: SIGNIFICANT CHANGE UP

## 2022-06-24 PROCEDURE — 99212 OFFICE O/P EST SF 10 MIN: CPT

## 2022-06-24 NOTE — HISTORY OF PRESENT ILLNESS
[FreeTextEntry1] : Patient is here for follow-up\par She had left ureteroscopy with stone basketing.  I left her without a stent.\par CT scan preop had suggested to proximal ureteral stones, however, there was only 1 stone with an unusual shape given the appearance of 2 stones.  The left kidney was thoroughly inspected and no other stones were seen.\par \par She feels well\par She has no pain

## 2022-06-24 NOTE — ASSESSMENT
[FreeTextEntry1] : 2x24 hr urines ordered\par She will review the results with  at her visit with her\par \par Follow-up in 4 months\par Ultrasound at next visit

## 2022-07-26 ENCOUNTER — APPOINTMENT (OUTPATIENT)
Dept: NEPHROLOGY | Facility: CLINIC | Age: 42
End: 2022-07-26

## 2022-07-26 VITALS
SYSTOLIC BLOOD PRESSURE: 99 MMHG | RESPIRATION RATE: 17 BRPM | WEIGHT: 150 LBS | BODY MASS INDEX: 28.32 KG/M2 | HEIGHT: 61 IN | HEART RATE: 83 BPM | DIASTOLIC BLOOD PRESSURE: 69 MMHG

## 2022-07-26 DIAGNOSIS — Z82.49 FAMILY HISTORY OF ISCHEMIC HEART DISEASE AND OTHER DISEASES OF THE CIRCULATORY SYSTEM: ICD-10-CM

## 2022-07-26 DIAGNOSIS — N20.0 CALCULUS OF KIDNEY: ICD-10-CM

## 2022-07-26 DIAGNOSIS — Z78.9 OTHER SPECIFIED HEALTH STATUS: ICD-10-CM

## 2022-07-26 DIAGNOSIS — N80.9 ENDOMETRIOSIS, UNSPECIFIED: ICD-10-CM

## 2022-07-26 DIAGNOSIS — R82.991 HYPOCITRATURIA: ICD-10-CM

## 2022-07-26 DIAGNOSIS — Z83.49 FAMILY HISTORY OF OTHER ENDOCRINE, NUTRITIONAL AND METABOLIC DISEASES: ICD-10-CM

## 2022-07-26 DIAGNOSIS — G43.909 MIGRAINE, UNSPECIFIED, NOT INTRACTABLE, W/OUT STATUS MIGRAINOSUS: ICD-10-CM

## 2022-07-26 DIAGNOSIS — Z83.3 FAMILY HISTORY OF DIABETES MELLITUS: ICD-10-CM

## 2022-07-26 PROCEDURE — 99204 OFFICE O/P NEW MOD 45 MIN: CPT

## 2022-07-26 RX ORDER — POTASSIUM CITRATE 10 MEQ/1
10 MEQ TABLET, EXTENDED RELEASE ORAL TWICE DAILY
Qty: 180 | Refills: 3 | Status: ACTIVE | COMMUNITY
Start: 2022-07-26 | End: 1900-01-01

## 2022-08-12 LAB
25(OH)D3 SERPL-MCNC: 29.8 NG/ML
ALBUMIN SERPL ELPH-MCNC: 4.4 G/DL
ANION GAP SERPL CALC-SCNC: 13 MMOL/L
APPEARANCE: CLEAR
BACTERIA: NEGATIVE
BASOPHILS # BLD AUTO: 0.05 K/UL
BASOPHILS NFR BLD AUTO: 0.5 %
BILIRUBIN URINE: NEGATIVE
BLOOD URINE: ABNORMAL
BUN SERPL-MCNC: 14 MG/DL
CALCIUM SERPL-MCNC: 9.4 MG/DL
CALCIUM SERPL-MCNC: 9.4 MG/DL
CHLORIDE SERPL-SCNC: 107 MMOL/L
CO2 SERPL-SCNC: 23 MMOL/L
COLOR: YELLOW
CREAT SERPL-MCNC: 0.66 MG/DL
CREAT SPEC-SCNC: 148 MG/DL
CREAT/PROT UR: 0.1 RATIO
EGFR: 113 ML/MIN/1.73M2
EOSINOPHIL # BLD AUTO: 0.26 K/UL
EOSINOPHIL NFR BLD AUTO: 2.7 %
GLUCOSE QUALITATIVE U: NEGATIVE
GLUCOSE SERPL-MCNC: 70 MG/DL
HCT VFR BLD CALC: 39.1 %
HGB BLD-MCNC: 12.9 G/DL
HYALINE CASTS: 0 /LPF
IMM GRANULOCYTES NFR BLD AUTO: 0.3 %
KETONES URINE: NEGATIVE
LEUKOCYTE ESTERASE URINE: NEGATIVE
LYMPHOCYTES # BLD AUTO: 3.97 K/UL
LYMPHOCYTES NFR BLD AUTO: 40.5 %
MAN DIFF?: NORMAL
MCHC RBC-ENTMCNC: 30.4 PG
MCHC RBC-ENTMCNC: 33 GM/DL
MCV RBC AUTO: 92.2 FL
MICROSCOPIC-UA: NORMAL
MONOCYTES # BLD AUTO: 0.64 K/UL
MONOCYTES NFR BLD AUTO: 6.5 %
NEUTROPHILS # BLD AUTO: 4.85 K/UL
NEUTROPHILS NFR BLD AUTO: 49.5 %
NITRITE URINE: NEGATIVE
PARATHYROID HORMONE INTACT: 24 PG/ML
PH URINE: 6
PHOSPHATE SERPL-MCNC: 3.1 MG/DL
PLATELET # BLD AUTO: 261 K/UL
POTASSIUM SERPL-SCNC: 4.7 MMOL/L
PROT UR-MCNC: 9 MG/DL
PROTEIN URINE: NEGATIVE
RBC # BLD: 4.24 M/UL
RBC # FLD: 13.3 %
RED BLOOD CELLS URINE: 1 /HPF
SODIUM SERPL-SCNC: 144 MMOL/L
SPECIFIC GRAVITY URINE: 1.03
SQUAMOUS EPITHELIAL CELLS: 0 /HPF
URATE SERPL-MCNC: 4.6 MG/DL
UROBILINOGEN URINE: NORMAL
WBC # FLD AUTO: 9.8 K/UL
WHITE BLOOD CELLS URINE: 0 /HPF

## 2022-09-21 NOTE — PROGRESS NOTE ADULT - CRITICAL CARE SERVICES
You must understand that you've received an Urgent Care treatment only and that you may be released before all your medical problems are known or treated. You, the patient, will arrange for follow up care as instructed.  Follow up with your PCP or specialty clinic as directed within 2-5 days if not improved or as needed.  You can call [phone] to schedule an appointment with the appropriate provider.  If your condition worsens we recommend that you receive another evaluation at the emergency room immediately or contact your primary medical clinics after hours call service to discuss your concerns.  Please return here or go to the Emergency Department for any concerns or worsening of condition.       66

## 2022-10-28 ENCOUNTER — APPOINTMENT (OUTPATIENT)
Dept: UROLOGY | Facility: CLINIC | Age: 42
End: 2022-10-28

## 2023-10-25 NOTE — ASU PREOP CHECKLIST - AS BP NONINV SITE
left upper arm Cellcept Counseling:  I discussed with the patient the risks of mycophenolate mofetil including but not limited to infection/immunosuppression, GI upset, hypokalemia, hypercholesterolemia, bone marrow suppression, lymphoproliferative disorders, malignancy, GI ulceration/bleed/perforation, colitis, interstitial lung disease, kidney failure, progressive multifocal leukoencephalopathy, and birth defects.  The patient understands that monitoring is required including a baseline creatinine and regular CBC testing. In addition, patient must alert us immediately if symptoms of infection or other concerning signs are noted.

## 2024-05-23 ENCOUNTER — NON-APPOINTMENT (OUTPATIENT)
Age: 44
End: 2024-05-23

## 2024-07-22 NOTE — PATIENT PROFILE ADULT - OVER THE PAST TWO WEEKS HAVE YOU FELT DOWN, DEPRESSED OR HOPELESS?
[facility] EMERGENCY DEPARTMENT  EMERGENCY DEPARTMENT ENCOUNTER        Pt Name: Anabelle Galan  MRN: [id number]  Birthdate 1961  Date of evaluation: 7/22/2024  Provider: Alka Harris DO  PCP: Ismael Godoy APRN - CNP  Note Started: 1:39 PM EDT 7/22/24    CHIEF COMPLAINT       Chief Complaint   Patient presents with    Headache     1 week ago. Nausea and vomiting     Back Pain     Lower back pain        HISTORY OF PRESENT ILLNESS: 1 or more Elements       Anabelle Galan is a 63 y.o. female who presents to the emergency department with a chief complaint of headache and back pain.  The history is obtained from the patient as well as patient's medical record.  Patient states that she has been experiencing a severe headache which has been present for the last 4 days.  This is moderate in severity.  Describes throbbing and aching.  She does have photosensitivity as well as sensitivity to sound.  She is also complaining of pain and aching in her back.  She has no numbness, weakness or paresthesias.  States she has no history of headaches.  No recent head trauma.  She denies any nasal congestion or rhinorrhea.  She does admit to associated nausea with emesis    Nursing Notes were all reviewed and agreed with or any disagreements were addressed in the HPI.    REVIEW OF SYSTEMS :      Review of Systems   Constitutional:  Negative for chills and fatigue.   HENT:  Negative for congestion.    Eyes:  Negative for redness.   Respiratory:  Negative for shortness of breath.    Cardiovascular:  Negative for chest pain.   Gastrointestinal:  Negative for abdominal pain, nausea and vomiting.   Genitourinary:  Negative for dysuria.   Musculoskeletal:  Negative for arthralgias.   Skin:  Negative for rash.   Neurological:  Positive for headaches. Negative for light-headedness.   Psychiatric/Behavioral:  Negative for confusion.    All other systems reviewed and are negative.      Positives and 
no

## 2025-09-10 ENCOUNTER — APPOINTMENT (OUTPATIENT)
Dept: MRI IMAGING | Facility: CLINIC | Age: 45
End: 2025-09-10
Payer: COMMERCIAL

## 2025-09-10 PROCEDURE — 72197 MRI PELVIS W/O & W/DYE: CPT | Mod: 26

## (undated) DEVICE — FOLEY TRAY 16FR 5CC LTX UMETER CLOSED

## (undated) DEVICE — GLV 6.5 PROTEXIS (WHITE)

## (undated) DEVICE — PACK CYSTO

## (undated) DEVICE — POSITIONER FOAM EGG CRATE ULNAR 2PCS (PINK)

## (undated) DEVICE — VENODYNE/SCD SLEEVE CALF MEDIUM

## (undated) DEVICE — WARMING BLANKET UPPER ADULT

## (undated) DEVICE — SOL IRR POUR NS 0.9% 500ML

## (undated) DEVICE — DRAPE EQUIPMENT BANDED BAG 30 X 30" (SHOWER CAP)

## (undated) DEVICE — VENODYNE/SCD SLEEVE CALF LARGE

## (undated) DEVICE — SYR ASEPTO

## (undated) DEVICE — SOL IRR BAG NS 0.9% 3000ML

## (undated) DEVICE — TUBING SET TUR BLADDER IRRIGATION Y-TYPE 81"

## (undated) DEVICE — POSITIONER STRAP ARMBOARD VELCRO TS-30

## (undated) DEVICE — FOLEY HOLDER STATLOCK 2 WAY ADULT

## (undated) DEVICE — ADAPTER CHECK FLO 9FR STERILE

## (undated) DEVICE — GLV 8 PROTEXIS (WHITE)

## (undated) DEVICE — IRR BULB PATHFINDER + 10"

## (undated) DEVICE — GLV 7 PROTEXIS (WHITE)

## (undated) DEVICE — GLV 7.5 PROTEXIS (WHITE)

## (undated) DEVICE — GOWN TRIMAX LG

## (undated) DEVICE — SOL IRR POUR H2O 500ML

## (undated) DEVICE — DRAPE COVER SNAP 36X30"

## (undated) DEVICE — ACMI SELF-SEALING SEAL UP TO 7FR

## (undated) DEVICE — SOL IRR BAG H2O 3000ML

## (undated) DEVICE — SOL IRR POUR H2O 1500ML

## (undated) DEVICE — TUBING RANGER FLUID IRRIGATION SET DISP

## (undated) DEVICE — POSITIONER FOAM HEADREST (PINK)

## (undated) DEVICE — CANISTER DISPOSABLE THIN WALL 3000CC

## (undated) DEVICE — TUBING LEVEL ONE NORMOFLO SET